# Patient Record
Sex: FEMALE | Race: WHITE | Employment: UNEMPLOYED | URBAN - METROPOLITAN AREA
[De-identification: names, ages, dates, MRNs, and addresses within clinical notes are randomized per-mention and may not be internally consistent; named-entity substitution may affect disease eponyms.]

---

## 2017-02-11 ENCOUNTER — TELEPHONE (OUTPATIENT)
Dept: INTERNAL MEDICINE CLINIC | Age: 57
End: 2017-02-11

## 2017-02-11 RX ORDER — VENLAFAXINE HYDROCHLORIDE 75 MG/1
75 CAPSULE, EXTENDED RELEASE ORAL DAILY
Qty: 90 CAPSULE | Refills: 3 | Status: SHIPPED | OUTPATIENT
Start: 2017-02-11 | End: 2018-04-09 | Stop reason: SDUPTHER

## 2017-03-30 RX ORDER — AMITRIPTYLINE HYDROCHLORIDE 25 MG/1
25 TABLET, FILM COATED ORAL NIGHTLY
Qty: 30 TABLET | Refills: 11 | Status: SHIPPED | OUTPATIENT
Start: 2017-03-30 | End: 2017-03-30 | Stop reason: SDUPTHER

## 2017-03-31 RX ORDER — AMITRIPTYLINE HYDROCHLORIDE 25 MG/1
25 TABLET, FILM COATED ORAL NIGHTLY
Qty: 30 TABLET | Refills: 11 | Status: SHIPPED | OUTPATIENT
Start: 2017-03-31 | End: 2017-05-01 | Stop reason: SDUPTHER

## 2017-04-03 ENCOUNTER — OFFICE VISIT (OUTPATIENT)
Dept: INTERNAL MEDICINE CLINIC | Age: 57
End: 2017-04-03

## 2017-04-03 VITALS
SYSTOLIC BLOOD PRESSURE: 135 MMHG | WEIGHT: 220 LBS | HEIGHT: 64 IN | RESPIRATION RATE: 18 BRPM | BODY MASS INDEX: 37.56 KG/M2 | HEART RATE: 86 BPM | DIASTOLIC BLOOD PRESSURE: 85 MMHG

## 2017-04-03 DIAGNOSIS — Z86.010 HISTORY OF COLONIC POLYPS: Primary | ICD-10-CM

## 2017-04-03 DIAGNOSIS — Z13.31 POSITIVE DEPRESSION SCREENING: ICD-10-CM

## 2017-04-03 DIAGNOSIS — E66.9 OBESITY (BMI 30-39.9): ICD-10-CM

## 2017-04-03 DIAGNOSIS — I10 ESSENTIAL HYPERTENSION: ICD-10-CM

## 2017-04-03 DIAGNOSIS — R73.9 HYPERGLYCEMIA: ICD-10-CM

## 2017-04-03 DIAGNOSIS — E03.9 ACQUIRED HYPOTHYROIDISM: ICD-10-CM

## 2017-04-03 PROCEDURE — G8431 POS CLIN DEPRES SCRN F/U DOC: HCPCS | Performed by: INTERNAL MEDICINE

## 2017-04-03 PROCEDURE — 99214 OFFICE O/P EST MOD 30 MIN: CPT | Performed by: INTERNAL MEDICINE

## 2017-04-03 RX ORDER — ESOMEPRAZOLE MAGNESIUM 40 MG/1
40 CAPSULE, DELAYED RELEASE ORAL
Qty: 90 CAPSULE | Refills: 3 | Status: SHIPPED | OUTPATIENT
Start: 2017-04-03 | End: 2017-04-17 | Stop reason: ALTCHOICE

## 2017-04-03 RX ORDER — ESOMEPRAZOLE MAGNESIUM 40 MG/1
40 CAPSULE, DELAYED RELEASE ORAL DAILY
Qty: 30 CAPSULE | Refills: 3 | Status: SHIPPED | OUTPATIENT
Start: 2017-04-03 | End: 2018-05-01 | Stop reason: SDUPTHER

## 2017-04-03 RX ORDER — LEVOTHYROXINE SODIUM 0.05 MG/1
50 TABLET ORAL DAILY
Qty: 30 TABLET | Refills: 3 | Status: SHIPPED | OUTPATIENT
Start: 2017-04-03 | End: 2017-08-07 | Stop reason: SDUPTHER

## 2017-04-03 RX ORDER — ESOMEPRAZOLE MAGNESIUM 40 MG/1
CAPSULE, DELAYED RELEASE ORAL
COMMUNITY
Start: 2017-01-02 | End: 2017-04-03 | Stop reason: SDUPTHER

## 2017-04-03 RX ORDER — AMLODIPINE BESYLATE 5 MG/1
5 TABLET ORAL DAILY
Qty: 30 TABLET | Refills: 3 | Status: SHIPPED | OUTPATIENT
Start: 2017-04-03 | End: 2017-04-17

## 2017-04-03 RX ORDER — VENLAFAXINE HYDROCHLORIDE 75 MG/1
150 CAPSULE, EXTENDED RELEASE ORAL DAILY
Qty: 30 CAPSULE | Refills: 3 | Status: SHIPPED | OUTPATIENT
Start: 2017-04-03 | End: 2017-04-03

## 2017-04-03 RX ORDER — HYDROCHLOROTHIAZIDE 25 MG/1
25 TABLET ORAL DAILY
Qty: 30 TABLET | Refills: 3 | Status: SHIPPED | OUTPATIENT
Start: 2017-04-03 | End: 2017-07-03 | Stop reason: SDUPTHER

## 2017-04-05 ENCOUNTER — TELEPHONE (OUTPATIENT)
Dept: BARIATRICS/WEIGHT MGMT | Age: 57
End: 2017-04-05

## 2017-04-12 ENCOUNTER — TELEPHONE (OUTPATIENT)
Dept: INTERNAL MEDICINE CLINIC | Age: 57
End: 2017-04-12

## 2017-04-17 ENCOUNTER — OFFICE VISIT (OUTPATIENT)
Dept: INTERNAL MEDICINE CLINIC | Age: 57
End: 2017-04-17

## 2017-04-17 VITALS
HEIGHT: 64 IN | DIASTOLIC BLOOD PRESSURE: 90 MMHG | BODY MASS INDEX: 37.56 KG/M2 | WEIGHT: 220 LBS | RESPIRATION RATE: 18 BRPM | HEART RATE: 90 BPM | SYSTOLIC BLOOD PRESSURE: 135 MMHG

## 2017-04-17 DIAGNOSIS — E03.9 ACQUIRED HYPOTHYROIDISM: ICD-10-CM

## 2017-04-17 DIAGNOSIS — R00.2 PALPITATIONS: ICD-10-CM

## 2017-04-17 DIAGNOSIS — F41.9 ANXIETY: ICD-10-CM

## 2017-04-17 DIAGNOSIS — I10 UNCONTROLLED HYPERTENSION: Primary | ICD-10-CM

## 2017-04-17 DIAGNOSIS — R51.9 HEADACHE, UNSPECIFIED HEADACHE TYPE: ICD-10-CM

## 2017-04-17 PROCEDURE — 93000 ELECTROCARDIOGRAM COMPLETE: CPT | Performed by: INTERNAL MEDICINE

## 2017-04-17 PROCEDURE — 99215 OFFICE O/P EST HI 40 MIN: CPT | Performed by: INTERNAL MEDICINE

## 2017-04-17 RX ORDER — METOPROLOL TARTRATE 50 MG/1
50 TABLET, FILM COATED ORAL 2 TIMES DAILY
Qty: 60 TABLET | Refills: 3 | Status: SHIPPED | OUTPATIENT
Start: 2017-04-17 | End: 2017-05-01 | Stop reason: SDUPTHER

## 2017-04-17 RX ORDER — DIAZEPAM 5 MG/1
TABLET ORAL
COMMUNITY
Start: 2017-03-15 | End: 2017-06-12

## 2017-04-26 ENCOUNTER — HOSPITAL ENCOUNTER (OUTPATIENT)
Dept: MRI IMAGING | Age: 57
Discharge: HOME | End: 2017-04-26
Attending: INTERNAL MEDICINE | Admitting: INTERNAL MEDICINE

## 2017-04-26 DIAGNOSIS — E03.9 ACQUIRED HYPOTHYROIDISM: ICD-10-CM

## 2017-04-26 DIAGNOSIS — R51.9 HEADACHE, UNSPECIFIED HEADACHE TYPE: ICD-10-CM

## 2017-04-26 DIAGNOSIS — R00.2 PALPITATIONS: ICD-10-CM

## 2017-04-26 LAB
LV EF: 63 %
LVEF MODALITY: NORMAL
TSH REFLEX FT4: 1.17 UIU/ML (ref 0.27–4.2)

## 2017-05-01 ENCOUNTER — OFFICE VISIT (OUTPATIENT)
Dept: INTERNAL MEDICINE CLINIC | Age: 57
End: 2017-05-01

## 2017-05-01 VITALS
BODY MASS INDEX: 37.73 KG/M2 | DIASTOLIC BLOOD PRESSURE: 89 MMHG | HEIGHT: 64 IN | HEART RATE: 71 BPM | SYSTOLIC BLOOD PRESSURE: 160 MMHG | WEIGHT: 221 LBS

## 2017-05-01 DIAGNOSIS — I10 UNCONTROLLED HYPERTENSION: ICD-10-CM

## 2017-05-01 PROCEDURE — 99213 OFFICE O/P EST LOW 20 MIN: CPT | Performed by: INTERNAL MEDICINE

## 2017-05-01 RX ORDER — AMITRIPTYLINE HYDROCHLORIDE 25 MG/1
25 TABLET, FILM COATED ORAL 2 TIMES DAILY PRN
Qty: 60 TABLET | Refills: 11 | Status: SHIPPED | OUTPATIENT
Start: 2017-05-01 | End: 2018-06-08 | Stop reason: SDUPTHER

## 2017-05-01 RX ORDER — METOPROLOL TARTRATE 100 MG/1
100 TABLET ORAL 2 TIMES DAILY
Qty: 60 TABLET | Refills: 11 | Status: SHIPPED | OUTPATIENT
Start: 2017-05-01 | End: 2017-08-02 | Stop reason: SDUPTHER

## 2017-05-03 ENCOUNTER — TELEPHONE (OUTPATIENT)
Dept: INTERNAL MEDICINE CLINIC | Age: 57
End: 2017-05-03

## 2017-05-12 ENCOUNTER — TELEPHONE (OUTPATIENT)
Dept: INTERNAL MEDICINE CLINIC | Age: 57
End: 2017-05-12

## 2017-05-16 ENCOUNTER — OFFICE VISIT (OUTPATIENT)
Dept: INTERNAL MEDICINE CLINIC | Age: 57
End: 2017-05-16

## 2017-05-16 VITALS
BODY MASS INDEX: 37.39 KG/M2 | DIASTOLIC BLOOD PRESSURE: 82 MMHG | TEMPERATURE: 97.8 F | SYSTOLIC BLOOD PRESSURE: 134 MMHG | HEIGHT: 64 IN | WEIGHT: 219 LBS | HEART RATE: 68 BPM | OXYGEN SATURATION: 94 %

## 2017-05-16 DIAGNOSIS — E03.9 ACQUIRED HYPOTHYROIDISM: ICD-10-CM

## 2017-05-16 DIAGNOSIS — Z12.11 COLON CANCER SCREENING: ICD-10-CM

## 2017-05-16 DIAGNOSIS — I10 ESSENTIAL HYPERTENSION: Primary | ICD-10-CM

## 2017-05-16 PROCEDURE — 99213 OFFICE O/P EST LOW 20 MIN: CPT | Performed by: INTERNAL MEDICINE

## 2017-05-25 ENCOUNTER — HOSPITAL ENCOUNTER (OUTPATIENT)
Dept: WOMENS IMAGING | Age: 57
Discharge: HOME | End: 2017-05-25
Attending: SURGERY | Admitting: SURGERY

## 2017-05-25 DIAGNOSIS — Z12.31 VISIT FOR SCREENING MAMMOGRAM: ICD-10-CM

## 2017-06-05 ENCOUNTER — TELEPHONE (OUTPATIENT)
Dept: BARIATRICS/WEIGHT MGMT | Age: 57
End: 2017-06-05

## 2017-06-12 ENCOUNTER — OFFICE VISIT (OUTPATIENT)
Dept: BARIATRICS/WEIGHT MGMT | Age: 57
End: 2017-06-12

## 2017-06-12 VITALS
BODY MASS INDEX: 38.07 KG/M2 | DIASTOLIC BLOOD PRESSURE: 86 MMHG | HEIGHT: 64 IN | WEIGHT: 223 LBS | SYSTOLIC BLOOD PRESSURE: 134 MMHG

## 2017-06-12 DIAGNOSIS — I10 HTN (HYPERTENSION), BENIGN: ICD-10-CM

## 2017-06-12 DIAGNOSIS — E03.9 ACQUIRED HYPOTHYROIDISM: ICD-10-CM

## 2017-06-12 DIAGNOSIS — E78.1 HYPERTRIGLYCERIDEMIA: ICD-10-CM

## 2017-06-12 DIAGNOSIS — E66.9 CLASS 2 OBESITY: Primary | ICD-10-CM

## 2017-06-12 PROCEDURE — 99204 OFFICE O/P NEW MOD 45 MIN: CPT | Performed by: FAMILY MEDICINE

## 2017-06-12 ASSESSMENT — ENCOUNTER SYMPTOMS
EYES NEGATIVE: 1
RESPIRATORY NEGATIVE: 1
GASTROINTESTINAL NEGATIVE: 1

## 2017-07-03 DIAGNOSIS — I10 ESSENTIAL HYPERTENSION: ICD-10-CM

## 2017-07-03 RX ORDER — HYDROCHLOROTHIAZIDE 25 MG/1
TABLET ORAL
Qty: 30 TABLET | Refills: 2 | Status: SHIPPED | OUTPATIENT
Start: 2017-07-03 | End: 2017-10-31 | Stop reason: SDUPTHER

## 2017-08-01 ENCOUNTER — TELEPHONE (OUTPATIENT)
Dept: FAMILY MEDICINE CLINIC | Age: 57
End: 2017-08-01

## 2017-08-02 ENCOUNTER — OFFICE VISIT (OUTPATIENT)
Dept: INTERNAL MEDICINE CLINIC | Age: 57
End: 2017-08-02

## 2017-08-02 VITALS
RESPIRATION RATE: 18 BRPM | BODY MASS INDEX: 37.9 KG/M2 | HEART RATE: 71 BPM | OXYGEN SATURATION: 97 % | DIASTOLIC BLOOD PRESSURE: 90 MMHG | HEIGHT: 64 IN | SYSTOLIC BLOOD PRESSURE: 138 MMHG | WEIGHT: 222 LBS

## 2017-08-02 DIAGNOSIS — I10 ESSENTIAL HYPERTENSION: Primary | ICD-10-CM

## 2017-08-02 DIAGNOSIS — R51.9 NONINTRACTABLE EPISODIC HEADACHE, UNSPECIFIED HEADACHE TYPE: ICD-10-CM

## 2017-08-02 PROCEDURE — 99214 OFFICE O/P EST MOD 30 MIN: CPT | Performed by: NURSE PRACTITIONER

## 2017-08-02 RX ORDER — METOPROLOL TARTRATE 100 MG/1
TABLET ORAL
COMMUNITY
Start: 2017-07-07 | End: 2018-03-31 | Stop reason: SDUPTHER

## 2017-08-02 RX ORDER — AMLODIPINE BESYLATE 5 MG/1
TABLET ORAL
COMMUNITY
Start: 2016-09-28 | End: 2018-06-08

## 2017-08-02 ASSESSMENT — ENCOUNTER SYMPTOMS
SHORTNESS OF BREATH: 0
PHOTOPHOBIA: 0
ABDOMINAL PAIN: 0
BACK PAIN: 0

## 2017-08-07 DIAGNOSIS — E03.9 ACQUIRED HYPOTHYROIDISM: ICD-10-CM

## 2017-08-07 RX ORDER — LEVOTHYROXINE SODIUM 0.05 MG/1
TABLET ORAL
Qty: 30 TABLET | Refills: 2 | Status: SHIPPED | OUTPATIENT
Start: 2017-08-07 | End: 2017-11-09 | Stop reason: SDUPTHER

## 2017-10-31 DIAGNOSIS — I10 ESSENTIAL HYPERTENSION: ICD-10-CM

## 2017-10-31 RX ORDER — CHOLECALCIFEROL (VITAMIN D3) 125 MCG
CAPSULE ORAL
Qty: 30 TABLET | Refills: 5 | Status: SHIPPED | OUTPATIENT
Start: 2017-10-31 | End: 2018-06-29 | Stop reason: SDUPTHER

## 2017-10-31 RX ORDER — HYDROCHLOROTHIAZIDE 25 MG/1
TABLET ORAL
Qty: 30 TABLET | Refills: 2 | Status: SHIPPED | OUTPATIENT
Start: 2017-10-31 | End: 2017-12-29 | Stop reason: SDUPTHER

## 2017-11-09 DIAGNOSIS — I10 ESSENTIAL HYPERTENSION: ICD-10-CM

## 2017-11-09 DIAGNOSIS — E03.9 ACQUIRED HYPOTHYROIDISM: ICD-10-CM

## 2017-11-09 RX ORDER — LEVOTHYROXINE SODIUM 0.05 MG/1
TABLET ORAL
Qty: 30 TABLET | Refills: 2 | Status: SHIPPED | OUTPATIENT
Start: 2017-11-09 | End: 2018-02-07 | Stop reason: SDUPTHER

## 2017-11-09 RX ORDER — LOSARTAN POTASSIUM 100 MG/1
100 TABLET ORAL DAILY
Qty: 30 TABLET | Refills: 5 | Status: SHIPPED | OUTPATIENT
Start: 2017-11-09 | End: 2018-05-10 | Stop reason: SDUPTHER

## 2018-02-07 DIAGNOSIS — E03.9 ACQUIRED HYPOTHYROIDISM: ICD-10-CM

## 2018-02-07 RX ORDER — LEVOTHYROXINE SODIUM 0.05 MG/1
TABLET ORAL
Qty: 30 TABLET | Refills: 2 | Status: SHIPPED | OUTPATIENT
Start: 2018-02-07 | End: 2018-08-09 | Stop reason: SDUPTHER

## 2018-03-31 DIAGNOSIS — I10 ESSENTIAL HYPERTENSION: ICD-10-CM

## 2018-04-02 RX ORDER — METOPROLOL TARTRATE 100 MG/1
TABLET ORAL
Qty: 60 TABLET | Refills: 11 | Status: SHIPPED | OUTPATIENT
Start: 2018-04-02 | End: 2019-04-29 | Stop reason: SDUPTHER

## 2018-04-02 RX ORDER — HYDROCHLOROTHIAZIDE 25 MG/1
TABLET ORAL
Qty: 30 TABLET | Refills: 2 | Status: SHIPPED | OUTPATIENT
Start: 2018-04-02 | End: 2018-06-29 | Stop reason: SDUPTHER

## 2018-04-10 RX ORDER — VENLAFAXINE HYDROCHLORIDE 75 MG/1
75 CAPSULE, EXTENDED RELEASE ORAL DAILY
Qty: 90 CAPSULE | Refills: 2 | Status: SHIPPED | OUTPATIENT
Start: 2018-04-10 | End: 2018-10-08 | Stop reason: SDUPTHER

## 2018-05-03 RX ORDER — ESOMEPRAZOLE MAGNESIUM 40 MG/1
CAPSULE, DELAYED RELEASE ORAL
Qty: 90 CAPSULE | Refills: 0 | Status: SHIPPED | OUTPATIENT
Start: 2018-05-03 | End: 2018-08-03 | Stop reason: SDUPTHER

## 2018-05-30 ENCOUNTER — OFFICE VISIT (OUTPATIENT)
Dept: INTERNAL MEDICINE CLINIC | Age: 58
End: 2018-05-30

## 2018-05-30 VITALS
SYSTOLIC BLOOD PRESSURE: 145 MMHG | DIASTOLIC BLOOD PRESSURE: 90 MMHG | OXYGEN SATURATION: 93 % | HEART RATE: 78 BPM | BODY MASS INDEX: 39.09 KG/M2 | RESPIRATION RATE: 17 BRPM | WEIGHT: 229 LBS | HEIGHT: 64 IN | TEMPERATURE: 98.7 F

## 2018-05-30 DIAGNOSIS — J01.40 ACUTE NON-RECURRENT PANSINUSITIS: Primary | ICD-10-CM

## 2018-05-30 DIAGNOSIS — R05.9 COUGH: ICD-10-CM

## 2018-05-30 PROCEDURE — 99213 OFFICE O/P EST LOW 20 MIN: CPT | Performed by: NURSE PRACTITIONER

## 2018-05-30 RX ORDER — AMOXICILLIN AND CLAVULANATE POTASSIUM 875; 125 MG/1; MG/1
1 TABLET, FILM COATED ORAL 2 TIMES DAILY
Qty: 14 TABLET | Refills: 0 | Status: SHIPPED | OUTPATIENT
Start: 2018-05-30 | End: 2018-06-06

## 2018-05-30 RX ORDER — GUAIFENESIN AND CODEINE PHOSPHATE 100; 10 MG/5ML; MG/5ML
5 SOLUTION ORAL 4 TIMES DAILY PRN
Qty: 150 ML | Refills: 0 | Status: SHIPPED | OUTPATIENT
Start: 2018-05-30 | End: 2018-06-06

## 2018-05-30 ASSESSMENT — ENCOUNTER SYMPTOMS
SINUS PAIN: 1
SINUS PRESSURE: 1
COUGH: 1

## 2018-06-08 ENCOUNTER — OFFICE VISIT (OUTPATIENT)
Dept: INTERNAL MEDICINE CLINIC | Age: 58
End: 2018-06-08

## 2018-06-08 VITALS
HEIGHT: 64 IN | OXYGEN SATURATION: 94 % | SYSTOLIC BLOOD PRESSURE: 142 MMHG | HEART RATE: 70 BPM | DIASTOLIC BLOOD PRESSURE: 82 MMHG | BODY MASS INDEX: 38.07 KG/M2 | RESPIRATION RATE: 18 BRPM | WEIGHT: 223 LBS | TEMPERATURE: 99.6 F

## 2018-06-08 DIAGNOSIS — J04.0 LARYNGITIS: Primary | ICD-10-CM

## 2018-06-08 PROCEDURE — 99213 OFFICE O/P EST LOW 20 MIN: CPT | Performed by: NURSE PRACTITIONER

## 2018-06-08 ASSESSMENT — PATIENT HEALTH QUESTIONNAIRE - PHQ9
SUM OF ALL RESPONSES TO PHQ9 QUESTIONS 1 & 2: 0
SUM OF ALL RESPONSES TO PHQ QUESTIONS 1-9: 0
1. LITTLE INTEREST OR PLEASURE IN DOING THINGS: 0
2. FEELING DOWN, DEPRESSED OR HOPELESS: 0

## 2018-06-11 RX ORDER — AMITRIPTYLINE HYDROCHLORIDE 25 MG/1
TABLET, FILM COATED ORAL
Qty: 60 TABLET | Refills: 11 | Status: SHIPPED | OUTPATIENT
Start: 2018-06-11 | End: 2019-07-05 | Stop reason: SDUPTHER

## 2018-06-11 RX ORDER — AMITRIPTYLINE HYDROCHLORIDE 25 MG/1
TABLET, FILM COATED ORAL
Qty: 60 TABLET | Refills: 0 | Status: SHIPPED | OUTPATIENT
Start: 2018-06-11 | End: 2018-06-11 | Stop reason: SDUPTHER

## 2018-06-13 ASSESSMENT — ENCOUNTER SYMPTOMS
GASTROINTESTINAL NEGATIVE: 1
VOICE CHANGE: 1
COUGH: 0
RHINORRHEA: 0

## 2018-06-28 ENCOUNTER — HOSPITAL ENCOUNTER (OUTPATIENT)
Dept: WOMENS IMAGING | Age: 58
Discharge: HOME | End: 2018-06-28
Attending: SURGERY | Admitting: SURGERY

## 2018-06-28 DIAGNOSIS — Z12.31 VISIT FOR SCREENING MAMMOGRAM: ICD-10-CM

## 2018-06-29 DIAGNOSIS — I10 ESSENTIAL HYPERTENSION: ICD-10-CM

## 2018-06-29 RX ORDER — HYDROCHLOROTHIAZIDE 25 MG/1
TABLET ORAL
Qty: 30 TABLET | Refills: 2 | Status: SHIPPED | OUTPATIENT
Start: 2018-06-29 | End: 2018-09-29 | Stop reason: SDUPTHER

## 2018-06-29 RX ORDER — CHOLECALCIFEROL (VITAMIN D3) 125 MCG
CAPSULE ORAL
Qty: 30 TABLET | Refills: 5 | Status: SHIPPED | OUTPATIENT
Start: 2018-06-29 | End: 2018-12-28 | Stop reason: SDUPTHER

## 2018-07-11 ENCOUNTER — PAT TELEPHONE (OUTPATIENT)
Dept: PREADMISSION TESTING | Age: 58
End: 2018-07-11

## 2018-07-11 VITALS — HEIGHT: 64 IN | BODY MASS INDEX: 38.07 KG/M2 | WEIGHT: 223 LBS

## 2018-07-11 NOTE — PRE-PROCEDURE INSTRUCTIONS
[address]  time__10 am_________        Surgery time____________    Take the following medications with a sip of water:losartan, metoprolol, nexium and zyrtec    Do not eat or drink anything after 12:00 midnight prior to your surgery. This includes water chewing gum, mints and ice chips. You may brush your teeth and gargle the morning of your surgery, but do not swallow the water     Please see your family doctor/pediatrician for a history and physical and/or concerning medications. Bring any test results/reports from your physicians office. If you are under the care of a heart doctor or specialist doctor, please be aware that you may be asked to them for clearance    You may be asked to stop blood thinners such as Coumadin, Plavix, Fragmin, Lovenox, etc., or any anti-inflammatories such as:  Aspirin, Ibuprofen, Advil, Naproxen prior to your surgery. We also ask that you stop any OTC medications such as fish oil, vitamin E, glucosamine, garlic, Multivitamins, COQ 10, etc.    We ask that you do not smoke 24 hours prior to surgery  We ask that you do not  drink any alcoholic beverages 24 hours prior to surgery     You must make arrangements for a responsible adult to take you home after your surgery. For your safety you will not be allowed to leave alone or drive yourself home. Your surgery will be cancelled if you do not have a ride home. Also for your safety, it is strongly suggested that someone stay with you the first 24 hours after your surgery. A parent or legal guardian must accompany a child scheduled for surgery and plan to stay at the hospital until the child is discharged. Please do not bring other children with you. For your comfort, please wear simple loose fitting clothing to the hospital.  Please do not bring valuables.     Do not wear any make-up or nail polish on your fingers or toes      For your safety, please do not wear any jewelry or body piercing's on the day of surgery. All jewelry must be removed. If you have dentures, they will be removed before going to operating room. For your convenience, we will provide you with a container. If you wear contact lenses or glasses, they will be removed, please bring a case for them. If you have a living will and a durable power of  for healthcare, please bring in a copy. As part of our patient safety program to minimize surgical site infections, we ask you to do the following:    · Please notify your surgeon if you develop any illness between         now and the  day of your surgery. · This includes a cough, cold, fever, sore throat, nausea,         or vomiting, and diarrhea, etc.  ·  Please notify your surgeon if you experience dizziness, shortness         of breath or blurred vision between now and the time of your surgery. Do not shave your operative site 96 hours prior to surgery. For face and neck surgery, men may use an electric razor 48 hours   prior to surgery. You may shower the night before surgery or the morning of   your surgery with an antibacterial soap. You will need to bring a photo ID and insurance card    [facility] has an onsite pharmacy, would you like to utilize our pharmacy     If you will be staying overnight and use a C-pap machine, please bring   your C-pap to hospital     Our goal is to provide you with excellent care, therefore, visitors will be limited to two(2) in the room at a time so that we may focus on providing this care for you. Please contact pre-admission testing if you have any further questions. [facility] phone number:  [phone] [facility] fax number:  [phone]  Please note these are generalized instructions for all surgical cases, you may be provided with more specific instructions according to your surgery.

## 2018-07-13 ENCOUNTER — HOSPITAL ENCOUNTER (OUTPATIENT)
Dept: ENDOSCOPY | Age: 58
Discharge: HOME | End: 2018-07-13
Attending: INTERNAL MEDICINE | Admitting: INTERNAL MEDICINE

## 2018-07-13 VITALS
RESPIRATION RATE: 14 BRPM | OXYGEN SATURATION: 99 % | HEIGHT: 64 IN | TEMPERATURE: 97.2 F | DIASTOLIC BLOOD PRESSURE: 84 MMHG | WEIGHT: 220 LBS | SYSTOLIC BLOOD PRESSURE: 135 MMHG | HEART RATE: 66 BPM | BODY MASS INDEX: 37.56 KG/M2

## 2018-07-13 RX ORDER — MORPHINE SULFATE 2 MG/ML
2 INJECTION, SOLUTION INTRAMUSCULAR; INTRAVENOUS EVERY 5 MIN PRN
Status: DISCONTINUED | OUTPATIENT
Start: 2018-07-13 | End: 2018-07-14 | Stop reason: HOSPADM

## 2018-07-13 RX ORDER — OXYCODONE HYDROCHLORIDE 5 MG/1
5 TABLET ORAL PRN
Status: ACTIVE | OUTPATIENT
Start: 2018-07-13 | End: 2018-07-13

## 2018-07-13 RX ORDER — SODIUM CHLORIDE 0.9 % (FLUSH) 0.9 %
10 SYRINGE (ML) INJECTION PRN
Status: DISCONTINUED | OUTPATIENT
Start: 2018-07-13 | End: 2018-07-14 | Stop reason: HOSPADM

## 2018-07-13 RX ORDER — OXYCODONE HYDROCHLORIDE 5 MG/1
10 TABLET ORAL PRN
Status: ACTIVE | OUTPATIENT
Start: 2018-07-13 | End: 2018-07-13

## 2018-07-13 RX ORDER — ONDANSETRON 2 MG/ML
4 INJECTION INTRAMUSCULAR; INTRAVENOUS
Status: ACTIVE | OUTPATIENT
Start: 2018-07-13 | End: 2018-07-13

## 2018-07-13 RX ORDER — FENTANYL CITRATE 50 UG/ML
25 INJECTION, SOLUTION INTRAMUSCULAR; INTRAVENOUS EVERY 5 MIN PRN
Status: DISCONTINUED | OUTPATIENT
Start: 2018-07-13 | End: 2018-07-14 | Stop reason: HOSPADM

## 2018-07-13 RX ORDER — SODIUM CHLORIDE 9 MG/ML
INJECTION, SOLUTION INTRAVENOUS CONTINUOUS
Status: DISCONTINUED | OUTPATIENT
Start: 2018-07-13 | End: 2018-07-14 | Stop reason: HOSPADM

## 2018-07-13 RX ORDER — FENTANYL CITRATE 50 UG/ML
50 INJECTION, SOLUTION INTRAMUSCULAR; INTRAVENOUS EVERY 5 MIN PRN
Status: DISCONTINUED | OUTPATIENT
Start: 2018-07-13 | End: 2018-07-14 | Stop reason: HOSPADM

## 2018-07-13 RX ORDER — SODIUM CHLORIDE 0.9 % (FLUSH) 0.9 %
10 SYRINGE (ML) INJECTION EVERY 12 HOURS SCHEDULED
Status: DISCONTINUED | OUTPATIENT
Start: 2018-07-13 | End: 2018-07-14 | Stop reason: HOSPADM

## 2018-07-13 RX ORDER — MEPERIDINE HYDROCHLORIDE 25 MG/ML
12.5 INJECTION INTRAMUSCULAR; INTRAVENOUS; SUBCUTANEOUS EVERY 5 MIN PRN
Status: DISCONTINUED | OUTPATIENT
Start: 2018-07-13 | End: 2018-07-14 | Stop reason: HOSPADM

## 2018-07-13 RX ORDER — MORPHINE SULFATE 2 MG/ML
1 INJECTION, SOLUTION INTRAMUSCULAR; INTRAVENOUS EVERY 5 MIN PRN
Status: DISCONTINUED | OUTPATIENT
Start: 2018-07-13 | End: 2018-07-14 | Stop reason: HOSPADM

## 2018-07-13 RX ADMIN — SODIUM CHLORIDE: 9 INJECTION, SOLUTION INTRAVENOUS at 10:46

## 2018-07-13 ASSESSMENT — PAIN SCALES - GENERAL
PAINLEVEL_OUTOF10: 0
PAINLEVEL_OUTOF10: 0

## 2018-07-13 ASSESSMENT — PAIN - FUNCTIONAL ASSESSMENT: PAIN_FUNCTIONAL_ASSESSMENT: 0-10

## 2018-07-13 NOTE — OP NOTE
Colonoscopy Procedure Note      Patient: Keke Aparicio  : 1960  Acct#:     Procedure: Colonoscopy, diagnostic    Date:  2018    Surgeon:  Khalida Melgar MD    Referring Physician:  Rafy Grissom MD, MD    Previous Colonoscopy: YES  Date: ~8 years ago  Greater than 3 years: YES    Preoperative Diagnosis:  57 yo woman with PMH of colon polyps here for surveillance colonoscopy. She reports her last colonoscopy was about 8 years ago. She has a family history of colon polyps in her mother. Postoperative Diagnosis:    1) Normal colonoscopy. Consent:  The patient or their legal guardian has signed a consent, and is aware of the potential risks, benefits, alternatives, and potential complications of this procedure. These include, but are not limited to hemorrhage, bleeding, post procedural pain, perforation, phlebitis, aspiration, hypotension, hypoxia, cardiovascular events such as arryhthmia, and possibly death. Additionally, the possibility of missed colonic polyps and interval colon cancer was discussed in the consent. Anesthesia:  Administered by monitored anesthesia care. Procedure: An informed consent was obtained from the patient after explanation of indications, benefits, possible risks and complications of the procedure. The patient was then taken to the endoscopy suite, placed in the left lateral decubitus position, and the above IV anesthesia was administered. A digital rectal examination was performed and revealed negative without mass, lesions or tenderness. The Olympus video colonoscope was placed in the patient's rectum under digital direction and advanced to the cecum. The cecum was identified by characteristic anatomy and ballottment. The ileocecal valve was identified. The preparation was good. The terminal ileum was not intubated.     The scope was then withdrawn back through the cecum, ascending, transverse, descending, sigmoid colon, and rectum. Careful circumferential examination of the mucosa in these areas demonstrated no abnormalities. The scope was then withdrawn into the rectum and retroflexed. The retroflexed view of the anal verge and rectum demonstrates no abnormalities. The scope was straightened, the colon was decompressed and the scope was withdrawn from the patient. The patient tolerated the procedure well and was taken to the PACU in good condition. Estimated blood loss: None    Impression:    1) Normal colonoscopy. Recommendations:    1) Repeat colonoscopy in 5 years.      Ania Lizama, [facility]  7/13/2018  [phone]

## 2018-07-13 NOTE — ANESTHESIA PRE-OP
[facility] Department of Anesthesiology  Pre-Anesthesia Evaluation/Consultation       Name:  Pallavi Chowdhury  : 1960  Age:  56 y. o. MRN:  [id number]  Date: 2018           Procedure (Scheduled):  colonoscopy  Surgeon:  Dr. Raman Lee     No Known Allergies  Patient Active Problem List   Diagnosis    Obesity (BMI 30-39. 9)    Hypertriglyceridemia    Lipoma of torso    Hot flashes    Hyperglycemia    Acquired hypothyroidism    Essential hypertension    Allergic rhinitis    Status post hysterectomy    headache     Past Medical History:   Diagnosis Date    Anxiety     GERD (gastroesophageal reflux disease)     Hypertension     Hypothyroidism     Polyp of colon      Past Surgical History:   Procedure Laterality Date    COLONOSCOPY      HYSTERECTOMY       Social History   Substance Use Topics    Smoking status: Never Smoker    Smokeless tobacco: Never Used    Alcohol use Yes      Comment: soc     Medications  Current Outpatient Prescriptions on File Prior to Encounter   Medication Sig Dispense Refill    hydrochlorothiazide (HYDRODIURIL) 25 MG tablet TAKE 1 TABLET BY MOUTH ONCE A DAY 30 tablet 2    Cholecalciferol (VITAMIN D3) 2000 units TABS TAKE 1 TABLET BY MOUTH DAILY 30 tablet 5    amitriptyline (ELAVIL) 25 MG tablet TAKE 1 TABLET BY MOUTH TWICE DAILY AS NEEDED FOR SLEEP OR PAIN 60 tablet 11    losartan (COZAAR) 100 MG tablet TAKE 1 TABLET BY MOUTH DAILY 30 tablet 11    esomeprazole (NEXIUM) 40 MG delayed release capsule TAKE 1 CAPSULE BY MOUTH EVERY MORNING BEFORE BREAKFAST 90 capsule 0    venlafaxine (EFFEXOR XR) 75 MG extended release capsule TAKE 1 CAPSULE BY MOUTH DAILY 90 capsule 2    metoprolol (LOPRESSOR) 100 MG tablet TAKE 1 TABLET BY MOUTH TWICE DAILY 60 tablet 11    levothyroxine (SYNTHROID) 50 MCG tablet TAKE 1 TABLET BY MOUTH DAILY 30 tablet 2    azelastine HCl 0.15 % SOLN 2 sprays by Each Nare route       cetirizine (ZYRTEC) 10 MG tablet Take 10 mg by mouth daily        No current facility-administered medications on file prior to encounter.       Current Outpatient Prescriptions   Medication Sig Dispense Refill    hydrochlorothiazide (HYDRODIURIL) 25 MG tablet TAKE 1 TABLET BY MOUTH ONCE A DAY 30 tablet 2    Cholecalciferol (VITAMIN D3) 2000 units TABS TAKE 1 TABLET BY MOUTH DAILY 30 tablet 5    amitriptyline (ELAVIL) 25 MG tablet TAKE 1 TABLET BY MOUTH TWICE DAILY AS NEEDED FOR SLEEP OR PAIN 60 tablet 11    losartan (COZAAR) 100 MG tablet TAKE 1 TABLET BY MOUTH DAILY 30 tablet 11    esomeprazole (NEXIUM) 40 MG delayed release capsule TAKE 1 CAPSULE BY MOUTH EVERY MORNING BEFORE BREAKFAST 90 capsule 0    venlafaxine (EFFEXOR XR) 75 MG extended release capsule TAKE 1 CAPSULE BY MOUTH DAILY 90 capsule 2    metoprolol (LOPRESSOR) 100 MG tablet TAKE 1 TABLET BY MOUTH TWICE DAILY 60 tablet 11    levothyroxine (SYNTHROID) 50 MCG tablet TAKE 1 TABLET BY MOUTH DAILY 30 tablet 2    azelastine HCl 0.15 % SOLN 2 sprays by Each Nare route       cetirizine (ZYRTEC) 10 MG tablet Take 10 mg by mouth daily        Current Facility-Administered Medications   Medication Dose Route Frequency Provider Last Rate Last Dose    0.9 % sodium chloride infusion   Intravenous Continuous Laura Jaimes MD 75 mL/hr at 18 1046      sodium chloride flush 0.9 % injection 10 mL  10 mL Intravenous 2 times per day Laura Jaimes MD        sodium chloride flush 0.9 % injection 10 mL  10 mL Intravenous PRN Laura Jaimes MD         Vital Signs (Current)   Vitals:    18 1035   BP: (!) 166/99   Pulse: 71   Resp: 16   Temp: 97.6 °F (36.4 °C)   SpO2: 99%     Vital Signs Statistics (for past 48 hrs)     Temp  Av.6 °F (36.4 °C)  Min: 97.6 °F (36.4 °C)   Min taken time: 18 1035  Max: 97.6 °F (36.4 °C)   Max taken time: 18 1035  Pulse  Av  Min: 71   Min taken time: 18 1035  Max: 71   Max taken time: 18 1035  Resp  Av  Min: 12   Min taken time: 18 1035  Max: 12   Max taken time: 18 1035  BP  Min: 166/99   Min taken time: 18 1035  Max: 166/99   Max taken time: 18 1035  SpO2  Av %  Min: 99 %   Min taken time: 18 1035  Max: 99 %   Max taken time: 18 1035    BP Readings from Last 3 Encounters:   18 (!) 166/99   18 (!) 142/82   18 (!) 145/90     BMI  Body mass index is 37.76 kg/m². Estimated body mass index is 37.76 kg/m² as calculated from the following:    Height as of this encounter: 5' 4\" (1.626 m). Weight as of this encounter: 220 lb (99.8 kg). CBC   Lab Results   Component Value Date    WBC 13.9 2013    RBC 4.93 2013    HGB 13.5 2013    HCT 41.5 2013    MCV 84.3 2013    RDW 13.1 2013     2013     CMP    Lab Results   Component Value Date     10/28/2016    K 3.9 10/28/2016    CL 98 10/28/2016    CO2 26 10/28/2016    BUN 12 10/28/2016    CREATININE 0.6 10/28/2016    GFRAA >60 10/28/2016    GFRAA >60 2013    AGRATIO 1.3 2013    LABGLOM >60 10/28/2016    GLUCOSE 108 10/28/2016    PROT 7.7 2013    CALCIUM 9.5 10/28/2016    BILITOT 0.50 2013    ALKPHOS 132 2013    AST 22 2013    ALT 22 2013     BMP    Lab Results   Component Value Date     10/28/2016    K 3.9 10/28/2016    CL 98 10/28/2016    CO2 26 10/28/2016    BUN 12 10/28/2016    CREATININE 0.6 10/28/2016    CALCIUM 9.5 10/28/2016    GFRAA >60 10/28/2016    GFRAA >60 2013    LABGLOM >60 10/28/2016    GLUCOSE 108 10/28/2016     POCGlucose  No results for input(s): GLUCOSE in the last 72 hours.    Coags  No results found for: PROTIME, INR, APTT  HCG (If Applicable) No results found for: PREGTESTUR, PREGSERUM, HCG, HCGQUANT   ABGs No results found for: PHART, PO2ART, KYE4ABQ, IBD3NNM, BEART, E2KPLTNO   Type & Screen (If Applicable)  No results found for: Renetta Barker 2018  10:56 AM

## 2018-08-06 RX ORDER — ESOMEPRAZOLE MAGNESIUM 40 MG/1
CAPSULE, DELAYED RELEASE ORAL
Qty: 90 CAPSULE | Refills: 0 | Status: SHIPPED | OUTPATIENT
Start: 2018-08-06 | End: 2018-11-01 | Stop reason: SDUPTHER

## 2018-08-09 DIAGNOSIS — E03.9 ACQUIRED HYPOTHYROIDISM: ICD-10-CM

## 2018-08-09 RX ORDER — LEVOTHYROXINE SODIUM 0.05 MG/1
TABLET ORAL
Qty: 30 TABLET | Refills: 0 | Status: SHIPPED | OUTPATIENT
Start: 2018-08-09 | End: 2018-09-07 | Stop reason: SDUPTHER

## 2018-09-07 DIAGNOSIS — E03.9 ACQUIRED HYPOTHYROIDISM: ICD-10-CM

## 2018-09-07 RX ORDER — LEVOTHYROXINE SODIUM 0.05 MG/1
TABLET ORAL
Qty: 30 TABLET | Refills: 0 | Status: SHIPPED | OUTPATIENT
Start: 2018-09-07 | End: 2018-10-08 | Stop reason: SDUPTHER

## 2018-09-14 ENCOUNTER — OFFICE VISIT (OUTPATIENT)
Dept: INTERNAL MEDICINE CLINIC | Age: 58
End: 2018-09-14

## 2018-09-14 VITALS
HEART RATE: 68 BPM | RESPIRATION RATE: 16 BRPM | DIASTOLIC BLOOD PRESSURE: 94 MMHG | WEIGHT: 219 LBS | OXYGEN SATURATION: 95 % | SYSTOLIC BLOOD PRESSURE: 154 MMHG | BODY MASS INDEX: 37.39 KG/M2 | HEIGHT: 64 IN

## 2018-09-14 DIAGNOSIS — E66.9 OBESITY (BMI 30-39.9): ICD-10-CM

## 2018-09-14 DIAGNOSIS — Z12.11 SCREEN FOR COLON CANCER: ICD-10-CM

## 2018-09-14 DIAGNOSIS — R73.9 HYPERGLYCEMIA: ICD-10-CM

## 2018-09-14 DIAGNOSIS — I10 ESSENTIAL HYPERTENSION: ICD-10-CM

## 2018-09-14 DIAGNOSIS — E78.1 HYPERTRIGLYCERIDEMIA: ICD-10-CM

## 2018-09-14 DIAGNOSIS — E03.9 ACQUIRED HYPOTHYROIDISM: Primary | ICD-10-CM

## 2018-09-14 LAB
A/G RATIO: 1.6 (ref 1.1–2.2)
ALBUMIN SERPL-MCNC: 4.7 G/DL (ref 3.4–5)
ALP BLD-CCNC: 111 U/L (ref 40–129)
ALT SERPL-CCNC: 54 U/L (ref 10–40)
ANION GAP SERPL CALCULATED.3IONS-SCNC: 15 MMOL/L (ref 3–16)
AST SERPL-CCNC: 42 U/L (ref 15–37)
BILIRUB SERPL-MCNC: 0.7 MG/DL (ref 0–1)
BUN BLDV-MCNC: 13 MG/DL (ref 7–20)
CALCIUM SERPL-MCNC: 10.4 MG/DL (ref 8.3–10.6)
CHLORIDE BLD-SCNC: 95 MMOL/L (ref 99–110)
CHOLESTEROL, TOTAL: 207 MG/DL (ref 0–199)
CO2: 29 MMOL/L (ref 21–32)
CREAT SERPL-MCNC: 0.6 MG/DL (ref 0.6–1.1)
GFR AFRICAN AMERICAN: >60
GFR NON-AFRICAN AMERICAN: >60
GLOBULIN: 3 G/DL
GLUCOSE BLD-MCNC: 140 MG/DL (ref 70–99)
HDLC SERPL-MCNC: 39 MG/DL (ref 40–60)
LDL CHOLESTEROL CALCULATED: 123 MG/DL
POTASSIUM SERPL-SCNC: 3.7 MMOL/L (ref 3.5–5.1)
SODIUM BLD-SCNC: 139 MMOL/L (ref 136–145)
TOTAL PROTEIN: 7.7 G/DL (ref 6.4–8.2)
TRIGL SERPL-MCNC: 225 MG/DL (ref 0–150)
TSH REFLEX FT4: 2.43 UIU/ML (ref 0.27–4.2)
VLDLC SERPL CALC-MCNC: 45 MG/DL

## 2018-09-14 PROCEDURE — 99214 OFFICE O/P EST MOD 30 MIN: CPT | Performed by: INTERNAL MEDICINE

## 2018-09-14 RX ORDER — INDAPAMIDE 1.25 MG
TABLET ORAL
COMMUNITY
Start: 2018-08-13 | End: 2021-03-21

## 2018-09-14 NOTE — PROGRESS NOTES
CONSTITUTIONAL: [x] All Symptoms Negative  [] Fever     [] Chills     [] Weight Loss  [] Fatigue     [] Sweating     [] Weakness  Comments:     EYE: [x] All Symptoms Negative  [] Blurred Vision     [] Double Vision     [] Light Sensitivity  [] Eye Pain     [] Eye Discharge     [] Eye Redness  Comments:     GASTROINTESTINAL: [x] All Symptoms Negative  [] Heart Burn     [] Nausea     [] Vomiting  [] Abdominal Pain     Diarrhea     [] Constipation  [] Blood in Stool     [] Black Tarry Stool  Comments:     ENDO: [x] All Symptoms Negative  [] Easy Bruising     [] Increased Thirst  Comments:     SKIN: [x] All Symptoms Negative  [] Rash     [] Itching  Comments:     NEUROLOGICAL: [] All Symptoms Negative  [] Dizziness     [] Tingling     [] Tremor  [] Sensory Change     [] Speech Change     [] Focal Weakness  [] Seizures     [] LOC  Comments:  Compressed nerve in neck (both sides?)    CARDIOVASCULAR: [x] All Symptoms Negative  [] Chest Pain     [] Palpitations     [] Orthopnea  [] Cramps When Walking     [] Leg Swelling  Comments:     URINARY: [x] All Symptoms Negative  [] Pain/Burning     [] Urgency     [] Frequency  [] Blood in Urine     [] Flank Pain  Comments:     PSYCHIATRIC: [x] All Symptoms Negative  [] Nervous/Anxious     [] Suicidal Ideas     [] Substance Use/Abuse  [] Hallucinations     [] Nervous/Anxious     [] Insomnia  [] Memory Loss  Comments:     HENT: [x] All Symptoms Negative  [] Headache     [] Hearing Loss     [] Ringing     [] Ear Pain  [] Ear Discharge     [] Nose Bleed     [] Congestion  [] Stridor     [] Sore Throat  Comments:     RESPIRATORY: [x] All Symptoms Negative  [] Cough     [] Bloody Mucous     [] Sputum Production  [] Shortness of Breath     [] Wheezing  Comments:     MUSKOSKELETAL: [] All Symptoms Negative  [] Muscle Aches     [x] Neck Pain     [x] Back Pain  [] Joint Pain     [] Falls  Comments:
Chief Complaint   Patient presents with    Hypertension     HPI:     ROS (1+):        Medications reviewed and reconciled with what patient reports to be taking. There were no vitals taken for this visit. Physical Exam    ASSESSMENT/PLAN: Pt received counseling and, if relevant, printed instructions for all symptoms listed in CC and HPI, as well as for all diagnoses listed below. There are no diagnoses linked to this encounter. Problem List Items Addressed This Visit     None            No Follow-up on file.
Hypertriglyceridemia    Relevant Medications    metoprolol (LOPRESSOR) 100 MG tablet    losartan (COZAAR) 100 MG tablet    hydrochlorothiazide (HYDRODIURIL) 25 MG tablet    Hyperglycemia    Essential hypertension    Relevant Medications    metoprolol (LOPRESSOR) 100 MG tablet    losartan (COZAAR) 100 MG tablet    hydrochlorothiazide (HYDRODIURIL) 25 MG tablet    Acquired hypothyroidism - Primary    Relevant Medications    levothyroxine (SYNTHROID) 50 MCG tablet        Orders Placed This Encounter   Procedures    COMPREHENSIVE METABOLIC PANEL     Standing Status:   Future     Standing Expiration Date:   9/14/2019    Hep C AB RLFX HCV PCR-A     Standing Status:   Future     Standing Expiration Date:   9/14/2019    HIV-1 AND HIV-2 ANTIBODIES     Standing Status:   Future     Standing Expiration Date:   9/14/2019    LIPID PANEL     Standing Status:   Future     Standing Expiration Date:   9/14/2019     Order Specific Question:   Is Patient Fasting?/# of Hours     Answer:   yes    HEMOGLOBIN A1C     Standing Status:   Future     Standing Expiration Date:   9/14/2019    TSH WITH REFLEX TO FT4     Standing Status:   Future     Standing Expiration Date:   9/14/2019    POCT Fecal Immunochemical Test (FIT)     Standing Status:   Future     Standing Expiration Date:   9/14/2019       I have reconciled the medications in chart with what patient reports to be taking, and reviewed action/ side effects and how to take any new medications. Patient/caregiver understands purpose and side effects. A complete  list of medications was provided in their after-visit summary. No Follow-up on file. Time based billing: I spent over 25 minutes with this patient, and as is the nature of primary care and typical for my extended visits, over 50 percent of this visit was spent on counseling and coordination of care.

## 2018-09-15 LAB
ESTIMATED AVERAGE GLUCOSE: 134.1 MG/DL
HBA1C MFR BLD: 6.3 %
HIV AG/AB: NORMAL
HIV ANTIGEN: NORMAL
HIV-1 ANTIBODY: NORMAL
HIV-2 AB: NORMAL

## 2018-09-17 LAB
HEPATITIS C VIRUS AB BY CIA INDEX: <0.02 IV
HEPATITIS C VIRUS AB BY CIA INTERPRETATION: NEGATIVE

## 2018-09-29 DIAGNOSIS — I10 ESSENTIAL HYPERTENSION: ICD-10-CM

## 2018-10-01 RX ORDER — HYDROCHLOROTHIAZIDE 25 MG/1
TABLET ORAL
Qty: 30 TABLET | Refills: 2 | Status: SHIPPED | OUTPATIENT
Start: 2018-10-01 | End: 2018-12-28 | Stop reason: SDUPTHER

## 2018-10-01 NOTE — TELEPHONE ENCOUNTER
Patient requesting a medication refill.   Medication: hctz  Dosage: 25 mg  Frequency: 1 tab po daily  Last filled on: 6/29/18  [facility]  Next office visit: 3/18/18  Last regular office visit: 9/14/18

## 2018-10-08 DIAGNOSIS — E03.9 ACQUIRED HYPOTHYROIDISM: ICD-10-CM

## 2018-10-09 RX ORDER — LEVOTHYROXINE SODIUM 0.05 MG/1
TABLET ORAL
Qty: 30 TABLET | Refills: 0 | Status: SHIPPED | OUTPATIENT
Start: 2018-10-09 | End: 2018-11-28 | Stop reason: SDUPTHER

## 2018-10-09 RX ORDER — VENLAFAXINE HYDROCHLORIDE 75 MG/1
75 CAPSULE, EXTENDED RELEASE ORAL DAILY
Qty: 90 CAPSULE | Refills: 2 | Status: SHIPPED | OUTPATIENT
Start: 2018-10-09 | End: 2019-07-05 | Stop reason: SDUPTHER

## 2018-11-01 RX ORDER — ESOMEPRAZOLE MAGNESIUM 40 MG/1
CAPSULE, DELAYED RELEASE ORAL
Qty: 90 CAPSULE | Refills: 3 | Status: SHIPPED | OUTPATIENT
Start: 2018-11-01 | End: 2019-11-11 | Stop reason: SDUPTHER

## 2018-11-21 ENCOUNTER — APPOINTMENT (OUTPATIENT)
Dept: GENERAL RADIOLOGY | Age: 58
End: 2018-11-21
Payer: COMMERCIAL

## 2018-11-21 ENCOUNTER — HOSPITAL ENCOUNTER (EMERGENCY)
Age: 58
Discharge: HOME | End: 2018-11-21
Attending: EMERGENCY MEDICINE
Payer: COMMERCIAL

## 2018-11-21 VITALS
BODY MASS INDEX: 38.2 KG/M2 | HEIGHT: 64 IN | RESPIRATION RATE: 16 BRPM | HEART RATE: 74 BPM | OXYGEN SATURATION: 96 % | WEIGHT: 223.77 LBS | DIASTOLIC BLOOD PRESSURE: 92 MMHG | SYSTOLIC BLOOD PRESSURE: 146 MMHG | TEMPERATURE: 97.9 F

## 2018-11-21 DIAGNOSIS — S50.02XA CONTUSION OF LEFT ELBOW, INITIAL ENCOUNTER: Primary | ICD-10-CM

## 2018-11-21 DIAGNOSIS — S53.409A ELBOW SPRAIN, INITIAL ENCOUNTER: ICD-10-CM

## 2018-11-21 PROCEDURE — 6360000002 HC RX W HCPCS: Performed by: EMERGENCY MEDICINE

## 2018-11-21 PROCEDURE — 96372 THER/PROPH/DIAG INJ SC/IM: CPT

## 2018-11-21 PROCEDURE — 73090 X-RAY EXAM OF FOREARM: CPT

## 2018-11-21 PROCEDURE — 99283 EMERGENCY DEPT VISIT LOW MDM: CPT

## 2018-11-21 PROCEDURE — 73080 X-RAY EXAM OF ELBOW: CPT

## 2018-11-21 RX ORDER — ACETAMINOPHEN 500 MG
500 TABLET ORAL 4 TIMES DAILY PRN
Qty: 50 TABLET | Refills: 0 | Status: SHIPPED | OUTPATIENT
Start: 2018-11-21

## 2018-11-21 RX ORDER — KETOROLAC TROMETHAMINE 30 MG/ML
30 INJECTION, SOLUTION INTRAMUSCULAR; INTRAVENOUS ONCE
Status: COMPLETED | OUTPATIENT
Start: 2018-11-21 | End: 2018-11-21

## 2018-11-21 RX ORDER — IBUPROFEN 400 MG/1
400 TABLET ORAL EVERY 6 HOURS PRN
Qty: 20 TABLET | Refills: 0 | Status: SHIPPED | OUTPATIENT
Start: 2018-11-21 | End: 2019-09-09

## 2018-11-21 RX ADMIN — KETOROLAC TROMETHAMINE 30 MG: 30 INJECTION, SOLUTION INTRAMUSCULAR at 19:48

## 2018-11-21 ASSESSMENT — PAIN SCALES - GENERAL
PAINLEVEL_OUTOF10: 10
PAINLEVEL_OUTOF10: 10
PAINLEVEL_OUTOF10: 5
PAINLEVEL_OUTOF10: 3

## 2018-11-21 ASSESSMENT — PAIN DESCRIPTION - ORIENTATION: ORIENTATION: LEFT

## 2018-11-21 ASSESSMENT — PAIN DESCRIPTION - LOCATION
LOCATION: ELBOW
LOCATION: ARM

## 2018-11-21 ASSESSMENT — PAIN DESCRIPTION - PAIN TYPE: TYPE: ACUTE PAIN

## 2018-11-21 ASSESSMENT — PAIN DESCRIPTION - FREQUENCY: FREQUENCY: CONTINUOUS

## 2018-11-28 DIAGNOSIS — E03.9 ACQUIRED HYPOTHYROIDISM: ICD-10-CM

## 2018-11-29 RX ORDER — LEVOTHYROXINE SODIUM 0.05 MG/1
TABLET ORAL
Qty: 30 TABLET | Refills: 11 | Status: SHIPPED | OUTPATIENT
Start: 2018-11-29 | End: 2019-11-01 | Stop reason: SDUPTHER

## 2018-12-28 DIAGNOSIS — I10 ESSENTIAL HYPERTENSION: ICD-10-CM

## 2018-12-28 RX ORDER — CHOLECALCIFEROL (VITAMIN D3) 125 MCG
CAPSULE ORAL
Qty: 30 TABLET | Refills: 5 | Status: SHIPPED | OUTPATIENT
Start: 2018-12-28 | End: 2019-05-28 | Stop reason: SDUPTHER

## 2018-12-28 RX ORDER — HYDROCHLOROTHIAZIDE 25 MG/1
TABLET ORAL
Qty: 30 TABLET | Refills: 2 | Status: SHIPPED | OUTPATIENT
Start: 2018-12-28 | End: 2019-04-29 | Stop reason: SDUPTHER

## 2018-12-28 NOTE — TELEPHONE ENCOUNTER
Patient requesting a medication refill.   Medication: HCTZ and Vitamin D3  Frequency: 1 tab daily  Last filled on: 12/28/18  [facility]  Next office visit: 3/18/19  Last regular office visit: 9/14/18

## 2019-04-29 DIAGNOSIS — I10 ESSENTIAL HYPERTENSION: ICD-10-CM

## 2019-04-29 RX ORDER — METOPROLOL TARTRATE 100 MG/1
TABLET ORAL
Qty: 60 TABLET | Refills: 2 | Status: SHIPPED | OUTPATIENT
Start: 2019-04-29 | End: 2019-07-26 | Stop reason: SDUPTHER

## 2019-04-29 RX ORDER — HYDROCHLOROTHIAZIDE 25 MG/1
TABLET ORAL
Qty: 30 TABLET | Refills: 2 | Status: SHIPPED | OUTPATIENT
Start: 2019-04-29 | End: 2019-07-26 | Stop reason: SDUPTHER

## 2019-05-28 NOTE — TELEPHONE ENCOUNTER
Patient requesting a medication refill.   Medication vitamin D3  Dosage 2000 units  Frequencydaily  [facility] 12/28/18  [facility]  Next office visit6/3/19  Last regular office visit9/14/18

## 2019-05-30 RX ORDER — CHOLECALCIFEROL (VITAMIN D3) 50 MCG
TABLET ORAL
Qty: 30 TABLET | Refills: 5 | Status: SHIPPED | OUTPATIENT
Start: 2019-05-30 | End: 2020-01-02

## 2019-07-05 RX ORDER — AMITRIPTYLINE HYDROCHLORIDE 25 MG/1
TABLET, FILM COATED ORAL
Qty: 60 TABLET | Refills: 11 | Status: SHIPPED | OUTPATIENT
Start: 2019-07-05 | End: 2020-07-14

## 2019-07-05 RX ORDER — VENLAFAXINE HYDROCHLORIDE 75 MG/1
75 CAPSULE, EXTENDED RELEASE ORAL DAILY
Qty: 90 CAPSULE | Refills: 2 | Status: SHIPPED | OUTPATIENT
Start: 2019-07-05 | End: 2020-03-30

## 2019-07-26 DIAGNOSIS — I10 ESSENTIAL HYPERTENSION: ICD-10-CM

## 2019-07-26 RX ORDER — HYDROCHLOROTHIAZIDE 25 MG/1
TABLET ORAL
Qty: 30 TABLET | Refills: 2 | Status: SHIPPED | OUTPATIENT
Start: 2019-07-26 | End: 2019-10-26 | Stop reason: SDUPTHER

## 2019-07-26 RX ORDER — METOPROLOL TARTRATE 100 MG/1
TABLET ORAL
Qty: 60 TABLET | Refills: 2 | Status: SHIPPED | OUTPATIENT
Start: 2019-07-26 | End: 2019-09-09

## 2019-09-09 ENCOUNTER — OFFICE VISIT (OUTPATIENT)
Dept: INTERNAL MEDICINE CLINIC | Age: 59
End: 2019-09-09
Payer: COMMERCIAL

## 2019-09-09 VITALS
DIASTOLIC BLOOD PRESSURE: 90 MMHG | RESPIRATION RATE: 16 BRPM | WEIGHT: 223.4 LBS | OXYGEN SATURATION: 97 % | HEIGHT: 64 IN | SYSTOLIC BLOOD PRESSURE: 170 MMHG | TEMPERATURE: 99.3 F | HEART RATE: 64 BPM | BODY MASS INDEX: 38.14 KG/M2

## 2019-09-09 DIAGNOSIS — R73.9 HYPERGLYCEMIA: ICD-10-CM

## 2019-09-09 DIAGNOSIS — E03.9 ACQUIRED HYPOTHYROIDISM: ICD-10-CM

## 2019-09-09 DIAGNOSIS — I10 ESSENTIAL HYPERTENSION: ICD-10-CM

## 2019-09-09 DIAGNOSIS — I10 ESSENTIAL HYPERTENSION: Primary | ICD-10-CM

## 2019-09-09 DIAGNOSIS — Z11.59 ENCOUNTER FOR HEPATITIS C SCREENING TEST FOR LOW RISK PATIENT: ICD-10-CM

## 2019-09-09 LAB
A/G RATIO: 1.8 (ref 1.1–2.2)
ALBUMIN SERPL-MCNC: 4.7 G/DL (ref 3.4–5)
ALP BLD-CCNC: 97 U/L (ref 40–129)
ALT SERPL-CCNC: 23 U/L (ref 10–40)
ANION GAP SERPL CALCULATED.3IONS-SCNC: 17 MMOL/L (ref 3–16)
AST SERPL-CCNC: 20 U/L (ref 15–37)
BILIRUB SERPL-MCNC: 0.5 MG/DL (ref 0–1)
BUN BLDV-MCNC: 12 MG/DL (ref 7–20)
CALCIUM SERPL-MCNC: 9.8 MG/DL (ref 8.3–10.6)
CHLORIDE BLD-SCNC: 94 MMOL/L (ref 99–110)
CHOLESTEROL, TOTAL: 180 MG/DL (ref 0–199)
CO2: 26 MMOL/L (ref 21–32)
CREAT SERPL-MCNC: 0.6 MG/DL (ref 0.6–1.1)
GFR AFRICAN AMERICAN: >60
GFR NON-AFRICAN AMERICAN: >60
GLOBULIN: 2.6 G/DL
GLUCOSE BLD-MCNC: 137 MG/DL (ref 70–99)
HDLC SERPL-MCNC: 37 MG/DL (ref 40–60)
HEPATITIS C ANTIBODY INTERPRETATION: NORMAL
LDL CHOLESTEROL CALCULATED: 91 MG/DL
POTASSIUM SERPL-SCNC: 3.2 MMOL/L (ref 3.5–5.1)
SODIUM BLD-SCNC: 137 MMOL/L (ref 136–145)
TOTAL PROTEIN: 7.3 G/DL (ref 6.4–8.2)
TRIGL SERPL-MCNC: 258 MG/DL (ref 0–150)
TSH REFLEX FT4: 1.43 UIU/ML (ref 0.27–4.2)
VLDLC SERPL CALC-MCNC: 52 MG/DL

## 2019-09-09 PROCEDURE — 99214 OFFICE O/P EST MOD 30 MIN: CPT | Performed by: INTERNAL MEDICINE

## 2019-09-09 RX ORDER — METOPROLOL TARTRATE 100 MG/1
100 TABLET ORAL 2 TIMES DAILY
Qty: 60 TABLET | Refills: 2 | Status: SHIPPED | OUTPATIENT
Start: 2019-09-09

## 2019-09-09 RX ORDER — AMLODIPINE BESYLATE 5 MG/1
5 TABLET ORAL DAILY
Qty: 30 TABLET | Refills: 5 | Status: ON HOLD | OUTPATIENT
Start: 2019-09-09 | End: 2021-03-28 | Stop reason: HOSPADM

## 2019-09-09 ASSESSMENT — ENCOUNTER SYMPTOMS
SHORTNESS OF BREATH: 0
ANAL BLEEDING: 0
CHOKING: 0
COLOR CHANGE: 0
STRIDOR: 0
RESPIRATORY NEGATIVE: 1
DIARRHEA: 0
ABDOMINAL DISTENTION: 0
APNEA: 0
RECTAL PAIN: 0
EYE REDNESS: 0
COUGH: 0
ABDOMINAL PAIN: 0
GASTROINTESTINAL NEGATIVE: 1
SINUS PAIN: 0
EYE DISCHARGE: 0
EYES NEGATIVE: 1
FACIAL SWELLING: 0
BACK PAIN: 0
CHEST TIGHTNESS: 0
ALLERGIC/IMMUNOLOGIC NEGATIVE: 1
VOICE CHANGE: 0
BLOOD IN STOOL: 0
RHINORRHEA: 0
NAUSEA: 0
TROUBLE SWALLOWING: 0
CONSTIPATION: 0
PHOTOPHOBIA: 0
SORE THROAT: 0
EYE PAIN: 0
EYE ITCHING: 0
WHEEZING: 0
VOMITING: 0
SINUS PRESSURE: 0

## 2019-09-09 ASSESSMENT — PATIENT HEALTH QUESTIONNAIRE - PHQ9
2. FEELING DOWN, DEPRESSED OR HOPELESS: 0
SUM OF ALL RESPONSES TO PHQ QUESTIONS 1-9: 0
1. LITTLE INTEREST OR PLEASURE IN DOING THINGS: 0
SUM OF ALL RESPONSES TO PHQ9 QUESTIONS 1 & 2: 0
SUM OF ALL RESPONSES TO PHQ QUESTIONS 1-9: 0

## 2019-09-09 NOTE — PROGRESS NOTES
Chief Complaint   Patient presents with    Hypertension     BP and med check - Blurry vision L. eye  seen at [facility] on 08.22.2019       HPI: Here for htn followup and management of multiple chronic conditions as per the active problems list below, which I reviewed and updated with the patient today. States doing well with no new concerns except if noted below. Is working with [facility] about LEFT visiton changes (blurring)--no diagnosis given, however. I have reviewed the chart notes available from myself and other providers. I have addressed all activeproblems listed below, and created or updated the problems list as needed. I have reviewed and updated the problems list in detail with patient. Patient Active Problem List   Diagnosis    Obesity (BMI 30-39. 9)    Hypertriglyceridemia    Lipoma of torso    Hot flashes    Hyperglycemia    Acquired hypothyroidism    Essential hypertension    Allergic rhinitis    Status post hysterectomy    headache       No problem-specific Assessment & Plan notes found for this encounter.       Discussed all labs, other tests, and imaging if available   Lab Results   Component Value Date    WBC 13.9 (H) 02/18/2013    HGB 13.5 02/18/2013    HCT 41.5 02/18/2013    MCV 84.3 02/18/2013     02/18/2013    LYMPHOPCT 3.8 (L) 02/18/2013    RBC 4.93 02/18/2013    MCH 27.3 02/18/2013    MCHC 32.4 02/18/2013    RDW 13.1 02/18/2013     Lab Results   Component Value Date     09/09/2019    K 3.2 (L) 09/09/2019    CL 94 (L) 09/09/2019    CO2 26 09/09/2019    BUN 12 09/09/2019    CREATININE 0.6 09/09/2019    GLUCOSE 137 (H) 09/09/2019    CALCIUM 9.8 09/09/2019    PROT 7.3 09/09/2019    LABALBU 4.7 09/09/2019    BILITOT 0.5 09/09/2019    ALKPHOS 97 09/09/2019    AST 20 09/09/2019    ALT 23 09/09/2019    LABGLOM >60 09/09/2019    GFRAA >60 09/09/2019    AGRATIO 1.8 09/09/2019    GLOB 2.6 09/09/2019     Lab Results   Component Value Date    TRIG 258 (H) 09/09/2019    TRIG 225 reviewed, as documented by MA    Past Medical History:   Diagnosis Date    Anxiety     GERD (gastroesophageal reflux disease)     Hypertension     Hypothyroidism     Polyp of colon        Past Surgical History:   Procedure Laterality Date    COLONOSCOPY      COLONOSCOPY  07/13/2018    HYSTERECTOMY  2010       Social History     Socioeconomic History    Marital status:      Spouse name: Not on file    Number of children: Not on file    Years of education: Not on file    Highest education level: Not on file   Occupational History    Not on file   Social Needs    Financial resource strain: Not on file    Food insecurity:     Worry: Not on file     Inability: Not on file    Transportation needs:     Medical: Not on file     Non-medical: Not on file   Tobacco Use    Smoking status: Never Smoker    Smokeless tobacco: Never Used   Substance and Sexual Activity    Alcohol use: Yes     Comment: soc    Drug use: No    Sexual activity: Yes     Partners: Male   Lifestyle    Physical activity:     Days per week: Not on file     Minutes per session: Not on file    Stress: Not on file   Relationships    Social connections:     Talks on phone: Not on file     Gets together: Not on file     Attends Cheondoism service: Not on file     Active member of club or organization: Not on file     Attends meetings of clubs or organizations: Not on file     Relationship status: Not on file    Intimate partner violence:     Fear of current or ex partner: Not on file     Emotionally abused: Not on file     Physically abused: Not on file     Forced sexual activity: Not on file   Other Topics Concern    Not on file   Social History Narrative    Not on file       Family History   Problem Relation Age of Onset    High Blood Pressure Mother     Cancer Mother     Kidney Disease Mother     High Blood Pressure Father     Cancer Father         adrenal         Health Maintenance Due   Topic Date Due    Shingles

## 2019-09-10 LAB
ESTIMATED AVERAGE GLUCOSE: 122.6 MG/DL
HBA1C MFR BLD: 5.9 %

## 2019-09-30 ENCOUNTER — OFFICE VISIT (OUTPATIENT)
Dept: INTERNAL MEDICINE CLINIC | Age: 59
End: 2019-09-30
Payer: COMMERCIAL

## 2019-09-30 VITALS
BODY MASS INDEX: 38.17 KG/M2 | HEART RATE: 64 BPM | WEIGHT: 223.6 LBS | TEMPERATURE: 99.1 F | OXYGEN SATURATION: 96 % | HEIGHT: 64 IN | DIASTOLIC BLOOD PRESSURE: 83 MMHG | SYSTOLIC BLOOD PRESSURE: 137 MMHG | RESPIRATION RATE: 16 BRPM

## 2019-09-30 DIAGNOSIS — D17.23 LIPOMA OF RIGHT LOWER EXTREMITY: ICD-10-CM

## 2019-09-30 DIAGNOSIS — M25.522 LEFT ELBOW PAIN: ICD-10-CM

## 2019-09-30 DIAGNOSIS — I10 ESSENTIAL HYPERTENSION: Primary | ICD-10-CM

## 2019-09-30 DIAGNOSIS — E87.6 HYPOKALEMIA: ICD-10-CM

## 2019-09-30 PROCEDURE — 90686 IIV4 VACC NO PRSV 0.5 ML IM: CPT | Performed by: INTERNAL MEDICINE

## 2019-09-30 PROCEDURE — 99214 OFFICE O/P EST MOD 30 MIN: CPT | Performed by: INTERNAL MEDICINE

## 2019-09-30 PROCEDURE — 90471 IMMUNIZATION ADMIN: CPT | Performed by: INTERNAL MEDICINE

## 2019-10-17 ENCOUNTER — HOSPITAL ENCOUNTER (OUTPATIENT)
Dept: WOMENS IMAGING | Age: 59
Discharge: HOME | End: 2019-10-17
Payer: COMMERCIAL

## 2019-10-17 DIAGNOSIS — Z12.31 VISIT FOR SCREENING MAMMOGRAM: ICD-10-CM

## 2019-10-17 PROCEDURE — 77067 SCR MAMMO BI INCL CAD: CPT

## 2019-10-26 DIAGNOSIS — I10 ESSENTIAL HYPERTENSION: ICD-10-CM

## 2019-10-29 RX ORDER — HYDROCHLOROTHIAZIDE 25 MG/1
TABLET ORAL
Qty: 30 TABLET | Refills: 0 | Status: ON HOLD | OUTPATIENT
Start: 2019-10-29 | End: 2021-03-28 | Stop reason: HOSPADM

## 2019-11-01 DIAGNOSIS — E03.9 ACQUIRED HYPOTHYROIDISM: ICD-10-CM

## 2019-11-01 RX ORDER — LEVOTHYROXINE SODIUM 0.05 MG/1
TABLET ORAL
Qty: 30 TABLET | Refills: 11 | Status: SHIPPED | OUTPATIENT
Start: 2019-11-01

## 2019-11-15 ENCOUNTER — TELEPHONE (OUTPATIENT)
Dept: ADMINISTRATIVE | Age: 59
End: 2019-11-15

## 2019-11-18 PROBLEM — K21.9 GASTROESOPHAGEAL REFLUX DISEASE WITHOUT ESOPHAGITIS: Status: ACTIVE | Noted: 2019-11-18

## 2019-12-23 DIAGNOSIS — I10 ESSENTIAL HYPERTENSION: ICD-10-CM

## 2019-12-26 RX ORDER — METOPROLOL TARTRATE 100 MG/1
100 TABLET ORAL 2 TIMES DAILY
Qty: 60 TABLET | Refills: 2 | OUTPATIENT
Start: 2019-12-26

## 2020-01-02 RX ORDER — CHOLECALCIFEROL (VITAMIN D3) 125 MCG
CAPSULE ORAL
Qty: 30 TABLET | Refills: 5 | Status: SHIPPED | OUTPATIENT
Start: 2020-01-02 | End: 2020-06-29

## 2020-01-24 RX ORDER — METOPROLOL TARTRATE 100 MG/1
100 TABLET ORAL 2 TIMES DAILY
Qty: 60 TABLET | Refills: 2 | OUTPATIENT
Start: 2020-01-24

## 2020-03-30 RX ORDER — VENLAFAXINE HYDROCHLORIDE 75 MG/1
75 CAPSULE, EXTENDED RELEASE ORAL DAILY
Qty: 90 CAPSULE | Refills: 2 | Status: SHIPPED | OUTPATIENT
Start: 2020-03-30

## 2020-04-30 RX ORDER — LOSARTAN POTASSIUM 100 MG/1
100 TABLET ORAL DAILY
Qty: 30 TABLET | Refills: 11 | Status: ON HOLD | OUTPATIENT
Start: 2020-04-30 | End: 2021-03-28 | Stop reason: HOSPADM

## 2020-05-29 RX ORDER — CHOLECALCIFEROL (VITAMIN D3) 125 MCG
CAPSULE ORAL
Qty: 30 TABLET | Refills: 5 | OUTPATIENT
Start: 2020-05-29

## 2020-06-29 RX ORDER — CHOLECALCIFEROL (VITAMIN D3) 125 MCG
CAPSULE ORAL
Qty: 30 TABLET | Refills: 5 | Status: SHIPPED | OUTPATIENT
Start: 2020-06-29 | End: 2021-03-21

## 2020-07-14 RX ORDER — AMITRIPTYLINE HYDROCHLORIDE 25 MG/1
TABLET, FILM COATED ORAL
Qty: 60 TABLET | Refills: 11 | Status: SHIPPED | OUTPATIENT
Start: 2020-07-14

## 2020-11-05 ENCOUNTER — HOSPITAL ENCOUNTER (OUTPATIENT)
Dept: WOMENS IMAGING | Age: 60
Discharge: HOME | End: 2020-11-05
Payer: COMMERCIAL

## 2020-11-05 PROCEDURE — 77067 SCR MAMMO BI INCL CAD: CPT

## 2020-11-09 LAB
AVERAGE GLUCOSE: NORMAL
HBA1C MFR BLD: 6 %

## 2020-11-10 RX ORDER — ESOMEPRAZOLE 20 MG/1
CAPSULE, DELAYED RELEASE ORAL
Qty: 180 CAPSULE | Refills: 0 | Status: CANCELLED | OUTPATIENT
Start: 2020-11-10

## 2020-11-10 NOTE — TELEPHONE ENCOUNTER
Requested Prescriptions     Pending Prescriptions Disp Refills    esomeprazole (GNP ESOMEPRAZOLE MAGNESIUM) 20 MG delayed release capsule [Pharmacy Med Name: GNP ESOMEPRAZOLE MAGNESIUM 20 CAP] 180 capsule 3     Sig: TAKE 2 CAPSULES BY MOUTH EVERY MORNING BEFORE BREAKFAST   Patient requesting a medication refill.   [facility]  Next office visit: Visit date not found  Last regular office visit: 9/30/2019

## 2020-11-30 RX ORDER — CHOLECALCIFEROL (VITAMIN D3) 125 MCG
CAPSULE ORAL
Qty: 30 TABLET | Refills: 5 | OUTPATIENT
Start: 2020-11-30

## 2020-11-30 NOTE — TELEPHONE ENCOUNTER
Requested Prescriptions     Pending Prescriptions Disp Refills    Cholecalciferol (VITAMIN D3) 50 MCG (2000 UT) TABS [Pharmacy Med Name: VIT D-3 2,000 UNIT 50MCG TB 50 MCG TAB] 30 tablet 5     Sig: TAKE 1 TABLET BY MOUTH EVERY DAY    esomeprazole (GNP ESOMEPRAZOLE MAGNESIUM) 20 MG delayed release capsule [Pharmacy Med Name: GNP ESOMEPRAZOLE MAGNESIUM 20 CAP] 180 capsule 3     Sig: TAKE 2 CAPSULES BY MOUTH EVERY MORNING BEFORE BREAKFAST   Patient requesting a medication refill.   Last filled on: 11/30/20  Pharmacy: [id number]  Next office visit: Visit date not found  Last regular office visit: 9/30/2019

## 2020-12-22 RX ORDER — CHOLECALCIFEROL (VITAMIN D3) 125 MCG
CAPSULE ORAL
Qty: 30 TABLET | Refills: 2 | OUTPATIENT
Start: 2020-12-22

## 2020-12-22 NOTE — TELEPHONE ENCOUNTER
Requested Prescriptions     Pending Prescriptions Disp Refills    Cholecalciferol (VITAMIN D3) 50 MCG (2000 UT) TABS [Pharmacy Med Name: VIT D-3 2,000 UNIT 50MCG TB 50 MCG TAB] 30 tablet 2     Sig: TAKE 1 TABLET BY MOUTH EVERY DAY   Patient requesting a medication refill. [facility]  Next office visit: Visit date not found  Last regular office visit: 9/30/2019    Needs appointment.

## 2021-03-21 ENCOUNTER — APPOINTMENT (OUTPATIENT)
Dept: GENERAL RADIOLOGY | Age: 61
DRG: 177 | End: 2021-03-21
Payer: COMMERCIAL

## 2021-03-21 ENCOUNTER — HOSPITAL ENCOUNTER (INPATIENT)
Age: 61
LOS: 6 days | Discharge: HOME | DRG: 177 | End: 2021-03-28
Attending: INTERNAL MEDICINE | Admitting: INTERNAL MEDICINE
Payer: COMMERCIAL

## 2021-03-21 DIAGNOSIS — E87.6 HYPOKALEMIA: ICD-10-CM

## 2021-03-21 DIAGNOSIS — R09.02 HYPOXIA: ICD-10-CM

## 2021-03-21 DIAGNOSIS — J96.00 ACUTE RESPIRATORY FAILURE DUE TO COVID-19 (HCC): Primary | ICD-10-CM

## 2021-03-21 DIAGNOSIS — E87.1 HYPONATREMIA: ICD-10-CM

## 2021-03-21 DIAGNOSIS — U07.1 ACUTE RESPIRATORY FAILURE DUE TO COVID-19 (HCC): Primary | ICD-10-CM

## 2021-03-21 LAB
BASOPHILS ABSOLUTE: 0 K/UL (ref 0–0.2)
BASOPHILS RELATIVE PERCENT: 0.1 %
EOSINOPHILS ABSOLUTE: 0 K/UL (ref 0–0.6)
EOSINOPHILS RELATIVE PERCENT: 0 %
HCT VFR BLD CALC: 33.6 % (ref 36–48)
HEMOGLOBIN: 12.2 G/DL (ref 12–16)
LACTIC ACID: 1 MMOL/L (ref 0.4–2)
LYMPHOCYTES ABSOLUTE: 0.6 K/UL (ref 1–5.1)
LYMPHOCYTES RELATIVE PERCENT: 10.8 %
MCH RBC QN AUTO: 29.2 PG (ref 26–34)
MCHC RBC AUTO-ENTMCNC: 36.3 G/DL (ref 31–36)
MCV RBC AUTO: 80.6 FL (ref 80–100)
MONOCYTES ABSOLUTE: 0.6 K/UL (ref 0–1.3)
MONOCYTES RELATIVE PERCENT: 10.8 %
NEUTROPHILS ABSOLUTE: 4.5 K/UL (ref 1.7–7.7)
NEUTROPHILS RELATIVE PERCENT: 78.3 %
PDW BLD-RTO: 13.1 % (ref 12.4–15.4)
PLATELET # BLD: 248 K/UL (ref 135–450)
PMV BLD AUTO: 6.8 FL (ref 5–10.5)
RBC # BLD: 4.17 M/UL (ref 4–5.2)
TROPONIN: <0.01 NG/ML
WBC # BLD: 5.8 K/UL (ref 4–11)

## 2021-03-21 PROCEDURE — 84484 ASSAY OF TROPONIN QUANT: CPT

## 2021-03-21 PROCEDURE — 85025 COMPLETE CBC W/AUTO DIFF WBC: CPT

## 2021-03-21 PROCEDURE — 93005 ELECTROCARDIOGRAM TRACING: CPT | Performed by: PHYSICIAN ASSISTANT

## 2021-03-21 PROCEDURE — 86870 RBC ANTIBODY IDENTIFICATION: CPT

## 2021-03-21 PROCEDURE — 6360000002 HC RX W HCPCS: Performed by: PHYSICIAN ASSISTANT

## 2021-03-21 PROCEDURE — 86902 BLOOD TYPE ANTIGEN DONOR EA: CPT

## 2021-03-21 PROCEDURE — 86880 COOMBS TEST DIRECT: CPT

## 2021-03-21 PROCEDURE — 80053 COMPREHEN METABOLIC PANEL: CPT

## 2021-03-21 PROCEDURE — 86905 BLOOD TYPING RBC ANTIGENS: CPT

## 2021-03-21 PROCEDURE — 6370000000 HC RX 637 (ALT 250 FOR IP): Performed by: PHYSICIAN ASSISTANT

## 2021-03-21 PROCEDURE — 96374 THER/PROPH/DIAG INJ IV PUSH: CPT

## 2021-03-21 PROCEDURE — 87040 BLOOD CULTURE FOR BACTERIA: CPT

## 2021-03-21 PROCEDURE — 86922 COMPATIBILITY TEST ANTIGLOB: CPT

## 2021-03-21 PROCEDURE — 86901 BLOOD TYPING SEROLOGIC RH(D): CPT

## 2021-03-21 PROCEDURE — 83605 ASSAY OF LACTIC ACID: CPT

## 2021-03-21 PROCEDURE — 2700000000 HC OXYGEN THERAPY PER DAY

## 2021-03-21 PROCEDURE — 86900 BLOOD TYPING SEROLOGIC ABO: CPT

## 2021-03-21 PROCEDURE — 99285 EMERGENCY DEPT VISIT HI MDM: CPT

## 2021-03-21 PROCEDURE — 71045 X-RAY EXAM CHEST 1 VIEW: CPT

## 2021-03-21 PROCEDURE — 86850 RBC ANTIBODY SCREEN: CPT

## 2021-03-21 RX ORDER — BENZONATATE 100 MG/1
200 CAPSULE ORAL ONCE
Status: COMPLETED | OUTPATIENT
Start: 2021-03-21 | End: 2021-03-21

## 2021-03-21 RX ORDER — ALBUTEROL SULFATE 90 UG/1
2 AEROSOL, METERED RESPIRATORY (INHALATION) EVERY 6 HOURS PRN
Status: DISCONTINUED | OUTPATIENT
Start: 2021-03-21 | End: 2021-03-28 | Stop reason: HOSPADM

## 2021-03-21 RX ORDER — ROSUVASTATIN CALCIUM 5 MG/1
5 TABLET, COATED ORAL DAILY
COMMUNITY

## 2021-03-21 RX ORDER — BENZONATATE 200 MG/1
200 CAPSULE ORAL 3 TIMES DAILY PRN
COMMUNITY

## 2021-03-21 RX ORDER — DEXAMETHASONE SODIUM PHOSPHATE 10 MG/ML
10 INJECTION, SOLUTION INTRAMUSCULAR; INTRAVENOUS ONCE
Status: COMPLETED | OUTPATIENT
Start: 2021-03-21 | End: 2021-03-21

## 2021-03-21 RX ORDER — POTASSIUM CHLORIDE 750 MG/1
10 TABLET, FILM COATED, EXTENDED RELEASE ORAL DAILY
COMMUNITY

## 2021-03-21 RX ORDER — POTASSIUM CHLORIDE 20 MEQ/1
40 TABLET, EXTENDED RELEASE ORAL ONCE
Status: COMPLETED | OUTPATIENT
Start: 2021-03-22 | End: 2021-03-22

## 2021-03-21 RX ADMIN — DEXAMETHASONE SODIUM PHOSPHATE 10 MG: 10 INJECTION, SOLUTION INTRAMUSCULAR; INTRAVENOUS at 23:18

## 2021-03-21 RX ADMIN — BENZONATATE 200 MG: 100 CAPSULE ORAL at 23:18

## 2021-03-21 ASSESSMENT — ENCOUNTER SYMPTOMS
COUGH: 1
ABDOMINAL PAIN: 0
COLOR CHANGE: 0
DIARRHEA: 1
SHORTNESS OF BREATH: 1
VOMITING: 0

## 2021-03-22 PROBLEM — J96.01 ACUTE RESPIRATORY FAILURE WITH HYPOXIA (HCC): Status: ACTIVE | Noted: 2021-03-22

## 2021-03-22 PROBLEM — J96.00 ACUTE RESPIRATORY FAILURE DUE TO COVID-19 (HCC): Status: ACTIVE | Noted: 2021-03-22

## 2021-03-22 PROBLEM — J12.82 PNEUMONIA DUE TO COVID-19 VIRUS: Status: ACTIVE | Noted: 2021-03-22

## 2021-03-22 PROBLEM — U07.1 PNEUMONIA DUE TO COVID-19 VIRUS: Status: ACTIVE | Noted: 2021-03-22

## 2021-03-22 LAB
A/G RATIO: 1 (ref 1.1–2.2)
A/G RATIO: 1.1 (ref 1.1–2.2)
ABO/RH: NORMAL
ALBUMIN SERPL-MCNC: 3.3 G/DL (ref 3.4–5)
ALBUMIN SERPL-MCNC: 3.5 G/DL (ref 3.4–5)
ALBUMIN SERPL-MCNC: 3.5 G/DL (ref 3.4–5)
ALP BLD-CCNC: 68 U/L (ref 40–129)
ALP BLD-CCNC: 75 U/L (ref 40–129)
ALT SERPL-CCNC: 22 U/L (ref 10–40)
ALT SERPL-CCNC: 22 U/L (ref 10–40)
ANION GAP SERPL CALCULATED.3IONS-SCNC: 12 MMOL/L (ref 3–16)
ANION GAP SERPL CALCULATED.3IONS-SCNC: 14 MMOL/L (ref 3–16)
ANION GAP SERPL CALCULATED.3IONS-SCNC: 8 MMOL/L (ref 3–16)
ANTIBODY IDENTIFICATION: NORMAL
ANTIBODY SCREEN: NORMAL
AST SERPL-CCNC: 32 U/L (ref 15–37)
AST SERPL-CCNC: 38 U/L (ref 15–37)
BACTERIA: ABNORMAL /HPF
BASOPHILS ABSOLUTE: 0 K/UL (ref 0–0.2)
BASOPHILS RELATIVE PERCENT: 0.2 %
BILIRUB SERPL-MCNC: 0.6 MG/DL (ref 0–1)
BILIRUB SERPL-MCNC: 0.6 MG/DL (ref 0–1)
BILIRUBIN URINE: NEGATIVE
BLOOD, URINE: NEGATIVE
BUN BLDV-MCNC: 10 MG/DL (ref 7–20)
BUN BLDV-MCNC: 10 MG/DL (ref 7–20)
BUN BLDV-MCNC: 9 MG/DL (ref 7–20)
C (BIG) ANTIGEN: NORMAL
CALCIUM SERPL-MCNC: 8.2 MG/DL (ref 8.3–10.6)
CALCIUM SERPL-MCNC: 8.5 MG/DL (ref 8.3–10.6)
CALCIUM SERPL-MCNC: 8.7 MG/DL (ref 8.3–10.6)
CHLORIDE BLD-SCNC: 81 MMOL/L (ref 99–110)
CHLORIDE BLD-SCNC: 83 MMOL/L (ref 99–110)
CHLORIDE BLD-SCNC: 88 MMOL/L (ref 99–110)
CLARITY: ABNORMAL
CO2: 23 MMOL/L (ref 21–32)
CO2: 25 MMOL/L (ref 21–32)
CO2: 26 MMOL/L (ref 21–32)
COLOR: YELLOW
CREAT SERPL-MCNC: 0.5 MG/DL (ref 0.6–1.1)
CREAT SERPL-MCNC: 0.5 MG/DL (ref 0.6–1.1)
CREAT SERPL-MCNC: <0.5 MG/DL (ref 0.6–1.1)
DAT IGG CAPTURE: NORMAL
E (LITTLE) ANTIGEN: NORMAL
EKG ATRIAL RATE: 83 BPM
EKG DIAGNOSIS: NORMAL
EKG P-R INTERVAL: 162 MS
EKG Q-T INTERVAL: 390 MS
EKG QRS DURATION: 84 MS
EKG QTC CALCULATION (BAZETT): 458 MS
EKG R AXIS: 2 DEGREES
EKG T AXIS: 6 DEGREES
EKG VENTRICULAR RATE: 83 BPM
EOSINOPHILS ABSOLUTE: 0 K/UL (ref 0–0.6)
EOSINOPHILS RELATIVE PERCENT: 0 %
EPITHELIAL CELLS, UA: 12 /HPF (ref 0–5)
FERRITIN: 563.9 NG/ML (ref 15–150)
GFR AFRICAN AMERICAN: >60
GFR NON-AFRICAN AMERICAN: >60
GLOBULIN: 3.3 G/DL
GLOBULIN: 3.5 G/DL
GLUCOSE BLD-MCNC: 109 MG/DL (ref 70–99)
GLUCOSE BLD-MCNC: 194 MG/DL (ref 70–99)
GLUCOSE BLD-MCNC: 200 MG/DL (ref 70–99)
GLUCOSE URINE: NEGATIVE MG/DL
HCT VFR BLD CALC: 33.5 % (ref 36–48)
HEMOGLOBIN: 11.9 G/DL (ref 12–16)
HYALINE CASTS: 1 /LPF (ref 0–8)
K ANTIGEN: NORMAL
KETONES, URINE: ABNORMAL MG/DL
LEUKOCYTE ESTERASE, URINE: ABNORMAL
LYMPHOCYTES ABSOLUTE: 0.3 K/UL (ref 1–5.1)
LYMPHOCYTES RELATIVE PERCENT: 11.4 %
MCH RBC QN AUTO: 28.7 PG (ref 26–34)
MCHC RBC AUTO-ENTMCNC: 35.5 G/DL (ref 31–36)
MCV RBC AUTO: 80.8 FL (ref 80–100)
MICROSCOPIC EXAMINATION: YES
MONOCYTES ABSOLUTE: 0.2 K/UL (ref 0–1.3)
MONOCYTES RELATIVE PERCENT: 6 %
NEUTROPHILS ABSOLUTE: 2.2 K/UL (ref 1.7–7.7)
NEUTROPHILS RELATIVE PERCENT: 82.4 %
NITRITE, URINE: NEGATIVE
OSMOLALITY URINE: 233 MOSM/KG (ref 390–1070)
PDW BLD-RTO: 13 % (ref 12.4–15.4)
PH UA: 7 (ref 5–8)
PHOSPHORUS: 1.8 MG/DL (ref 2.5–4.9)
PLATELET # BLD: 250 K/UL (ref 135–450)
PMV BLD AUTO: 7.3 FL (ref 5–10.5)
POTASSIUM REFLEX MAGNESIUM: 3.8 MMOL/L (ref 3.5–5.1)
POTASSIUM SERPL-SCNC: 3.2 MMOL/L (ref 3.5–5.1)
POTASSIUM SERPL-SCNC: 3.6 MMOL/L (ref 3.5–5.1)
POTASSIUM, UR: 12.4 MMOL/L
PROCALCITONIN: 0.08 NG/ML (ref 0–0.15)
PROTEIN UA: NEGATIVE MG/DL
RBC # BLD: 4.15 M/UL (ref 4–5.2)
RBC UA: 2 /HPF (ref 0–4)
SODIUM BLD-SCNC: 118 MMOL/L (ref 136–145)
SODIUM BLD-SCNC: 120 MMOL/L (ref 136–145)
SODIUM BLD-SCNC: 122 MMOL/L (ref 136–145)
SODIUM URINE: <20 MMOL/L
SODIUM URINE: <20 MMOL/L
SPECIFIC GRAVITY UA: 1.01 (ref 1–1.03)
T4 FREE: 1.9 NG/DL (ref 0.9–1.8)
TOTAL PROTEIN: 6.8 G/DL (ref 6.4–8.2)
TOTAL PROTEIN: 7 G/DL (ref 6.4–8.2)
TSH REFLEX: 0.17 UIU/ML (ref 0.27–4.2)
URINE REFLEX TO CULTURE: ABNORMAL
URINE TYPE: ABNORMAL
UROBILINOGEN, URINE: 0.2 E.U./DL
WBC # BLD: 2.7 K/UL (ref 4–11)
WBC UA: 8 /HPF (ref 0–5)

## 2021-03-22 PROCEDURE — 6360000002 HC RX W HCPCS: Performed by: NURSE PRACTITIONER

## 2021-03-22 PROCEDURE — 2580000003 HC RX 258: Performed by: NURSE PRACTITIONER

## 2021-03-22 PROCEDURE — 85025 COMPLETE CBC W/AUTO DIFF WBC: CPT

## 2021-03-22 PROCEDURE — 83935 ASSAY OF URINE OSMOLALITY: CPT

## 2021-03-22 PROCEDURE — 93010 ELECTROCARDIOGRAM REPORT: CPT | Performed by: INTERNAL MEDICINE

## 2021-03-22 PROCEDURE — 87040 BLOOD CULTURE FOR BACTERIA: CPT

## 2021-03-22 PROCEDURE — 84439 ASSAY OF FREE THYROXINE: CPT

## 2021-03-22 PROCEDURE — 2580000003 HC RX 258: Performed by: PHYSICIAN ASSISTANT

## 2021-03-22 PROCEDURE — 2500000003 HC RX 250 WO HCPCS: Performed by: INTERNAL MEDICINE

## 2021-03-22 PROCEDURE — 82728 ASSAY OF FERRITIN: CPT

## 2021-03-22 PROCEDURE — 6370000000 HC RX 637 (ALT 250 FOR IP): Performed by: NURSE PRACTITIONER

## 2021-03-22 PROCEDURE — 2580000003 HC RX 258: Performed by: INTERNAL MEDICINE

## 2021-03-22 PROCEDURE — 84300 ASSAY OF URINE SODIUM: CPT

## 2021-03-22 PROCEDURE — 84145 PROCALCITONIN (PCT): CPT

## 2021-03-22 PROCEDURE — 6360000002 HC RX W HCPCS: Performed by: INTERNAL MEDICINE

## 2021-03-22 PROCEDURE — 81001 URINALYSIS AUTO W/SCOPE: CPT

## 2021-03-22 PROCEDURE — 84133 ASSAY OF URINE POTASSIUM: CPT

## 2021-03-22 PROCEDURE — XW033E5 INTRODUCTION OF REMDESIVIR ANTI-INFECTIVE INTO PERIPHERAL VEIN, PERCUTANEOUS APPROACH, NEW TECHNOLOGY GROUP 5: ICD-10-PCS | Performed by: INTERNAL MEDICINE

## 2021-03-22 PROCEDURE — 84443 ASSAY THYROID STIM HORMONE: CPT

## 2021-03-22 PROCEDURE — 80053 COMPREHEN METABOLIC PANEL: CPT

## 2021-03-22 PROCEDURE — 36415 COLL VENOUS BLD VENIPUNCTURE: CPT

## 2021-03-22 PROCEDURE — 2060000000 HC ICU INTERMEDIATE R&B

## 2021-03-22 PROCEDURE — 99223 1ST HOSP IP/OBS HIGH 75: CPT | Performed by: INTERNAL MEDICINE

## 2021-03-22 PROCEDURE — 87449 NOS EACH ORGANISM AG IA: CPT

## 2021-03-22 PROCEDURE — 94761 N-INVAS EAR/PLS OXIMETRY MLT: CPT

## 2021-03-22 PROCEDURE — 2700000000 HC OXYGEN THERAPY PER DAY

## 2021-03-22 PROCEDURE — 6370000000 HC RX 637 (ALT 250 FOR IP): Performed by: PHYSICIAN ASSISTANT

## 2021-03-22 RX ORDER — VENLAFAXINE HYDROCHLORIDE 37.5 MG/1
37.5 CAPSULE, EXTENDED RELEASE ORAL DAILY
Status: DISCONTINUED | OUTPATIENT
Start: 2021-03-22 | End: 2021-03-28 | Stop reason: HOSPADM

## 2021-03-22 RX ORDER — ROSUVASTATIN CALCIUM 10 MG/1
5 TABLET, COATED ORAL DAILY
Status: DISCONTINUED | OUTPATIENT
Start: 2021-03-22 | End: 2021-03-28 | Stop reason: HOSPADM

## 2021-03-22 RX ORDER — IVERMECTIN 3 MG/1
200 TABLET ORAL ONCE
Status: COMPLETED | OUTPATIENT
Start: 2021-03-22 | End: 2021-03-22

## 2021-03-22 RX ORDER — DEXAMETHASONE SODIUM PHOSPHATE 10 MG/ML
6 INJECTION, SOLUTION INTRAMUSCULAR; INTRAVENOUS EVERY 24 HOURS
Status: DISCONTINUED | OUTPATIENT
Start: 2021-03-22 | End: 2021-03-22

## 2021-03-22 RX ORDER — METOPROLOL TARTRATE 50 MG/1
100 TABLET, FILM COATED ORAL 2 TIMES DAILY
Status: DISCONTINUED | OUTPATIENT
Start: 2021-03-22 | End: 2021-03-28 | Stop reason: HOSPADM

## 2021-03-22 RX ORDER — HYDROCHLOROTHIAZIDE 25 MG/1
25 TABLET ORAL DAILY
Status: DISCONTINUED | OUTPATIENT
Start: 2021-03-22 | End: 2021-03-22

## 2021-03-22 RX ORDER — PROMETHAZINE HYDROCHLORIDE 25 MG/1
12.5 TABLET ORAL EVERY 6 HOURS PRN
Status: DISCONTINUED | OUTPATIENT
Start: 2021-03-22 | End: 2021-03-28 | Stop reason: HOSPADM

## 2021-03-22 RX ORDER — AMLODIPINE BESYLATE 10 MG/1
10 TABLET ORAL DAILY
Status: DISCONTINUED | OUTPATIENT
Start: 2021-03-22 | End: 2021-03-28 | Stop reason: HOSPADM

## 2021-03-22 RX ORDER — GUAIFENESIN/DEXTROMETHORPHAN 100-10MG/5
5 SYRUP ORAL EVERY 4 HOURS PRN
Status: DISCONTINUED | OUTPATIENT
Start: 2021-03-22 | End: 2021-03-28 | Stop reason: HOSPADM

## 2021-03-22 RX ORDER — LEVOTHYROXINE SODIUM 0.05 MG/1
50 TABLET ORAL DAILY
Status: DISCONTINUED | OUTPATIENT
Start: 2021-03-22 | End: 2021-03-28 | Stop reason: HOSPADM

## 2021-03-22 RX ORDER — ACETAMINOPHEN 650 MG/1
650 SUPPOSITORY RECTAL EVERY 6 HOURS PRN
Status: DISCONTINUED | OUTPATIENT
Start: 2021-03-22 | End: 2021-03-28 | Stop reason: HOSPADM

## 2021-03-22 RX ORDER — SODIUM CHLORIDE 9 MG/ML
INJECTION, SOLUTION INTRAVENOUS CONTINUOUS
Status: DISCONTINUED | OUTPATIENT
Start: 2021-03-22 | End: 2021-03-25

## 2021-03-22 RX ORDER — POLYETHYLENE GLYCOL 3350 17 G/17G
17 POWDER, FOR SOLUTION ORAL DAILY PRN
Status: DISCONTINUED | OUTPATIENT
Start: 2021-03-22 | End: 2021-03-28 | Stop reason: HOSPADM

## 2021-03-22 RX ORDER — LOSARTAN POTASSIUM 100 MG/1
100 TABLET ORAL DAILY
Status: DISCONTINUED | OUTPATIENT
Start: 2021-03-22 | End: 2021-03-23

## 2021-03-22 RX ORDER — BENZONATATE 100 MG/1
200 CAPSULE ORAL 3 TIMES DAILY PRN
Status: DISCONTINUED | OUTPATIENT
Start: 2021-03-22 | End: 2021-03-28 | Stop reason: HOSPADM

## 2021-03-22 RX ORDER — SODIUM CHLORIDE 0.9 % (FLUSH) 0.9 %
10 SYRINGE (ML) INJECTION PRN
Status: DISCONTINUED | OUTPATIENT
Start: 2021-03-22 | End: 2021-03-28 | Stop reason: HOSPADM

## 2021-03-22 RX ORDER — 0.9 % SODIUM CHLORIDE 0.9 %
500 INTRAVENOUS SOLUTION INTRAVENOUS ONCE
Status: COMPLETED | OUTPATIENT
Start: 2021-03-22 | End: 2021-03-22

## 2021-03-22 RX ORDER — ACETAMINOPHEN 325 MG/1
650 TABLET ORAL EVERY 6 HOURS PRN
Status: DISCONTINUED | OUTPATIENT
Start: 2021-03-22 | End: 2021-03-28 | Stop reason: HOSPADM

## 2021-03-22 RX ORDER — POTASSIUM CHLORIDE 750 MG/1
10 TABLET, FILM COATED, EXTENDED RELEASE ORAL DAILY
Status: DISCONTINUED | OUTPATIENT
Start: 2021-03-22 | End: 2021-03-23

## 2021-03-22 RX ORDER — SODIUM CHLORIDE 0.9 % (FLUSH) 0.9 %
10 SYRINGE (ML) INJECTION EVERY 12 HOURS SCHEDULED
Status: DISCONTINUED | OUTPATIENT
Start: 2021-03-22 | End: 2021-03-27

## 2021-03-22 RX ORDER — AZELASTINE 1 MG/ML
2 SPRAY, METERED NASAL DAILY PRN
Status: DISCONTINUED | OUTPATIENT
Start: 2021-03-22 | End: 2021-03-28 | Stop reason: HOSPADM

## 2021-03-22 RX ORDER — DEXAMETHASONE SODIUM PHOSPHATE 10 MG/ML
10 INJECTION, SOLUTION INTRAMUSCULAR; INTRAVENOUS EVERY 24 HOURS
Status: DISCONTINUED | OUTPATIENT
Start: 2021-03-22 | End: 2021-03-22

## 2021-03-22 RX ORDER — ONDANSETRON 2 MG/ML
4 INJECTION INTRAMUSCULAR; INTRAVENOUS EVERY 6 HOURS PRN
Status: DISCONTINUED | OUTPATIENT
Start: 2021-03-22 | End: 2021-03-28 | Stop reason: HOSPADM

## 2021-03-22 RX ORDER — 0.9 % SODIUM CHLORIDE 0.9 %
30 INTRAVENOUS SOLUTION INTRAVENOUS PRN
Status: DISCONTINUED | OUTPATIENT
Start: 2021-03-22 | End: 2021-03-28 | Stop reason: HOSPADM

## 2021-03-22 RX ADMIN — SODIUM CHLORIDE: 9 INJECTION, SOLUTION INTRAVENOUS at 03:11

## 2021-03-22 RX ADMIN — SODIUM CHLORIDE 500 ML: 9 INJECTION, SOLUTION INTRAVENOUS at 00:22

## 2021-03-22 RX ADMIN — AMLODIPINE BESYLATE 10 MG: 10 TABLET ORAL at 08:16

## 2021-03-22 RX ADMIN — LOSARTAN POTASSIUM 100 MG: 100 TABLET, FILM COATED ORAL at 08:16

## 2021-03-22 RX ADMIN — ENOXAPARIN SODIUM 40 MG: 40 INJECTION SUBCUTANEOUS at 03:11

## 2021-03-22 RX ADMIN — BENZOCAINE AND MENTHOL 1 LOZENGE: 15; 3.6 LOZENGE ORAL at 03:11

## 2021-03-22 RX ADMIN — POTASSIUM CHLORIDE 10 MEQ: 750 TABLET, FILM COATED, EXTENDED RELEASE ORAL at 08:16

## 2021-03-22 RX ADMIN — BENZOCAINE AND MENTHOL 1 LOZENGE: 15; 3.6 LOZENGE ORAL at 11:43

## 2021-03-22 RX ADMIN — METOPROLOL TARTRATE 100 MG: 50 TABLET, FILM COATED ORAL at 08:16

## 2021-03-22 RX ADMIN — BENZOCAINE AND MENTHOL 1 LOZENGE: 15; 3.6 LOZENGE ORAL at 05:49

## 2021-03-22 RX ADMIN — REMDESIVIR 200 MG: 100 INJECTION, POWDER, LYOPHILIZED, FOR SOLUTION INTRAVENOUS at 13:36

## 2021-03-22 RX ADMIN — ENOXAPARIN SODIUM 40 MG: 40 INJECTION SUBCUTANEOUS at 20:30

## 2021-03-22 RX ADMIN — ENOXAPARIN SODIUM 40 MG: 40 INJECTION SUBCUTANEOUS at 08:17

## 2021-03-22 RX ADMIN — BENZONATATE 200 MG: 100 CAPSULE ORAL at 13:41

## 2021-03-22 RX ADMIN — POTASSIUM CHLORIDE 40 MEQ: 1500 TABLET, EXTENDED RELEASE ORAL at 00:19

## 2021-03-22 RX ADMIN — VENLAFAXINE HYDROCHLORIDE 37.5 MG: 37.5 CAPSULE, EXTENDED RELEASE ORAL at 08:25

## 2021-03-22 RX ADMIN — BENZOCAINE AND MENTHOL 1 LOZENGE: 15; 3.6 LOZENGE ORAL at 20:34

## 2021-03-22 RX ADMIN — IVERMECTIN 19.5 MG: 3 TABLET ORAL at 03:12

## 2021-03-22 RX ADMIN — METOPROLOL TARTRATE 100 MG: 50 TABLET, FILM COATED ORAL at 20:30

## 2021-03-22 RX ADMIN — SODIUM CHLORIDE, PRESERVATIVE FREE 10 ML: 5 INJECTION INTRAVENOUS at 08:17

## 2021-03-22 RX ADMIN — LEVOTHYROXINE SODIUM 50 MCG: 0.05 TABLET ORAL at 05:41

## 2021-03-22 RX ADMIN — SODIUM CHLORIDE, PRESERVATIVE FREE 10 ML: 5 INJECTION INTRAVENOUS at 20:30

## 2021-03-22 RX ADMIN — BENZOCAINE AND MENTHOL 1 LOZENGE: 15; 3.6 LOZENGE ORAL at 13:41

## 2021-03-22 RX ADMIN — BENZONATATE 200 MG: 100 CAPSULE ORAL at 20:34

## 2021-03-22 RX ADMIN — DEXAMETHASONE SODIUM PHOSPHATE 20 MG: 4 INJECTION, SOLUTION INTRA-ARTICULAR; INTRALESIONAL; INTRAMUSCULAR; INTRAVENOUS; SOFT TISSUE at 20:30

## 2021-03-22 RX ADMIN — ROSUVASTATIN CALCIUM 5 MG: 10 TABLET, FILM COATED ORAL at 08:15

## 2021-03-22 ASSESSMENT — PAIN SCALES - GENERAL
PAINLEVEL_OUTOF10: 0

## 2021-03-22 NOTE — PROGRESS NOTES
4 Eyes Skin Assessment     NAME:  Sonya Mcgraw OF BIRTH:  1960  MEDICAL RECORD NUMBER:  [id number]    The patient is being assess for  Admission    I agree that 2 RN's have performed a thorough Head to Toe Skin Assessment on the patient. ALL assessment sites listed below have been assessed. Areas assessed by both nurses:    Head, Face, Ears, Shoulders, Back, Chest, Arms, Elbows, Hands, Sacrum. Buttock, Coccyx, Ischium and Legs. Feet and Heels        Does the Patient have a Wound?  No noted wound(s)       Colby Prevention initiated:  NA   Wound Care Orders initiated:  NA    Pressure Injury (Stage 3,4, Unstageable, DTI, NWPT, and Complex wounds) if present place consult order under [de-identified] NA    New and Established Ostomies if present place consult order under : NA      Nurse 1 eSignature: Electronically signed by Robert Escalante RN on 3/22/21 at 4:22 AM EDT    **SHARE this note so that the co-signing nurse is able to place an eSignature**    Nurse 2 eSignature: Electronically signed by Nell Steven RN on 3/22/21 at 4:27 AM EDT

## 2021-03-22 NOTE — PROGRESS NOTES
Pt on 6L o2 and running around 91%. She is having some dry coughing at this time. Explained to patient care plan and new medicines we will be starting on her. Pulmonary and nephrology also saw her and explained 1.5L fluid restriction to pt. Verbalized understanding of all and  called to get an update on her as well. Bed alarm on and pt instructed to call when she needs to get up to use the bathroom as she is a fall risk with her oxygen and IV.

## 2021-03-22 NOTE — PROGRESS NOTES
Medication Reconciliation     List of medications patient is currently taking is complete. Source of information:   1. Conversation with patient at bedside  2. EPIC records        Notes regarding home medications:  1. Patient received all of her home medications today. 2. History of metformin 500mg fill on 2/23. Patient stated she picked it up but as not started taking it yet.    Midge Snellen, Pharmacy Intern 3/21/2021 10:56 PM

## 2021-03-22 NOTE — ED PROVIDER NOTES
[address] [facility]      Pt Name: Mina Alvarez  MRN: [id number]  [place] 1960  Date of evaluation: 3/21/2021  Provider: KRISTIAN Arenas    This patient was not seen and evaluated by the attending physician No att. providers found. CHIEF COMPLAINT       Chief Complaint   Patient presents with    Cough     covid + 3/12, worsening cough and sob    Shortness of Breath       CRITICAL CARE TIME   I performed a total Critical Care time of 31 minutes, excluding separately reportable procedures. There was a high probability of clinically significant/life threatening deterioration in the patient's condition which required my urgent intervention. Not limited to multiple reexaminations, discussions with attending physician and consultants. HISTORY OF PRESENT ILLNESS  (Location/Symptom, Timing/Onset, Context/Setting, Quality, Duration, Modifying Factors, Severity.)   Mina Alvarez is a 64 y.o. female who presents to the emergency department complaining of shortness of breath and cough. Progressively worsened since the 9th of March. On the 12th of March she was diagnosed with COVID-19. Cough is nonproductive she has had some diarrhea, no vomiting. She is a never smoker with a past medical history of diabetes, hypertension and hypothyroidism. No fevers. Nursing Notes were reviewed and I agree. REVIEW OF SYSTEMS    (2-9 systems for level 4, 10 or more for level 5)     Review of Systems   Constitutional: Positive for fatigue. Negative for fever. Respiratory: Positive for cough and shortness of breath. Cardiovascular: Negative for chest pain. Gastrointestinal: Positive for diarrhea. Negative for abdominal pain and vomiting. Musculoskeletal: Positive for myalgias. Skin: Negative for color change, rash and wound. Neurological: Negative for numbness.    Psychiatric/Behavioral: Negative for agitation, behavioral problems and confusion. Except as noted above the remainder of the review of systems was reviewed and negative. PAST MEDICAL HISTORY         Diagnosis Date    Anxiety     GERD (gastroesophageal reflux disease)     Hypertension     Hypothyroidism     Polyp of colon        SURGICAL HISTORY           Procedure Laterality Date    COLONOSCOPY      COLONOSCOPY  2018    HYSTERECTOMY  2010       CURRENT MEDICATIONS       Previous Medications    ACETAMINOPHEN (TYLENOL) 500 MG TABLET    Take 1 tablet by mouth 4 times daily as needed for Pain    AMITRIPTYLINE (ELAVIL) 25 MG TABLET    TAKE 1 TABLET BY MOUTH TWICE DAILY AS NEEDED FOR SLEEP OR PAIN    AMLODIPINE (NORVASC) 5 MG TABLET    Take 1 tablet by mouth daily    AZELASTINE HCL 0.15 % SOLN    2 sprays by Each Nare route     BENZONATATE (TESSALON) 200 MG CAPSULE    Take 200 mg by mouth 3 times daily as needed for Cough    HYDROCHLOROTHIAZIDE (HYDRODIURIL) 25 MG TABLET    TAKE 1 TABLET BY MOUTH ONCE A DAY    LEVOTHYROXINE (SYNTHROID) 50 MCG TABLET    TAKE 1 TABLET BY MOUTH DAILY    LOSARTAN (COZAAR) 100 MG TABLET    TAKE 1 TABLET BY MOUTH DAILY    METOPROLOL (LOPRESSOR) 100 MG TABLET    Take 1 tablet by mouth 2 times daily    POTASSIUM CHLORIDE (KLOR-CON) 10 MEQ EXTENDED RELEASE TABLET    Take 10 mEq by mouth daily    ROSUVASTATIN (CRESTOR) 5 MG TABLET    Take 5 mg by mouth daily    VENLAFAXINE (EFFEXOR XR) 75 MG EXTENDED RELEASE CAPSULE    TAKE 1 CAPSULE BY MOUTH DAILY       ALLERGIES     Patient has no known allergies. FAMILY HISTORY           Problem Relation Age of Onset    High Blood Pressure Mother     Cancer Mother     Kidney Disease Mother     High Blood Pressure Father     Cancer Father         adrenal     Family Status   Relation Name Status    Mother  (Not Specified)    Father          SOCIAL HISTORY      reports that she has never smoked. She has never used smokeless tobacco. She reports current alcohol use.  She reports that she does not use drugs. PHYSICAL EXAM    (up to 7 for level 4, 8 or more for level 5)     ED Triage Vitals [03/21/21 2219]   BP Temp Temp src Pulse Resp SpO2 Height Weight   123/68 98.2 °F (36.8 °C) -- 85 16 (!) 81 % -- --       Physical Exam  Vitals signs and nursing note reviewed. Constitutional:       Appearance: She is well-developed. HENT:      Head: Normocephalic and atraumatic. Neck:      Musculoskeletal: Normal range of motion. Cardiovascular:      Rate and Rhythm: Normal rate. Pulmonary:      Effort: No respiratory distress. Breath sounds: Wheezing present. Musculoskeletal:      Right lower leg: No edema. Left lower leg: No edema. Neurological:      General: No focal deficit present. Mental Status: She is alert and oriented to person, place, and time. Motor: No weakness. Psychiatric:         Mood and Affect: Mood normal.         Behavior: Behavior normal.         DIAGNOSTIC RESULTS     EKG: All EKG's are interpreted by KRISTIAN Mark in the absence of a cardiologist.    EKG interpreted by myself - please refer to attending physician's note for complete EKG interpretation:  No evidence of acute ischemia or injury. RADIOLOGY:   Non-plain film images such as CT, Ultrasound and MRI are read by the radiologist. Plain radiographic images are visualized and preliminarily interpreted by KRISTIAN Mark with the below findings:    Reviewed radiologist's interpretation. Interpretation per the Radiologist below, if available at the time of this note:    XR CHEST PORTABLE   Final Result   Multifocal bibasilar predominant airspace disease compatible with the   provided history of COVID-19 pneumonia.                LABS:  Labs Reviewed   CBC WITH AUTO DIFFERENTIAL - Abnormal; Notable for the following components:       Result Value    Hematocrit 33.6 (*)     MCHC 36.3 (*)     Lymphocytes Absolute 0.6 (*)     All other components within normal limits    Narrative:     Performed at:  [facility]  [address]   Phone [phone]   COMPREHENSIVE METABOLIC PANEL - Abnormal; Notable for the following components:    Sodium 118 (*)     Potassium 3.2 (*)     Chloride 81 (*)     Glucose 109 (*)     CREATININE 0.5 (*)     Calcium 8.2 (*)     AST 38 (*)     All other components within normal limits    Narrative:     [facility] [employer] tel. [phone],  Chemistry results called to and read back by Colleen Troy RN, 03/22/2021  00:05, by [facility]  Performed at:  [facility]  [address]   Phone [phone]   CULTURE, BLOOD 2   CULTURE, BLOOD 1   TROPONIN    Narrative:     [address]formed at:  [address]   Phone [phone]   LACTIC ACID, PLASMA    Narrative:     Performed at:  [address] [email or web address] [address]   Phone [phone]   OSMOLALITY, URINE   SODIUM, URINE, RANDOM   URINE RT REFLEX TO CULTURE   TYPE AND SCREEN       All other labs were within normal range or not returned as of this dictation. EMERGENCY DEPARTMENT COURSE and DIFFERENTIAL DIAGNOSIS/MDM:   Vitals:    Vitals:    03/21/21 2219 03/21/21 2342   BP: 123/68    Pulse: 85    Resp: 16    Temp: 98.2 °F (36.8 °C)    SpO2: (!) 81% 91%     Patient presents initially saturating 81% on room air. She has wheezes diffusely with cough that is nonproductive and a positive COVID-19 test approximately 9 days ago. No prior history of underlying lung disease or oxygen requirement. She is saturating 91 to 93% on 4 L via nasal cannula. Given steroids and 500 cc ns fluids in the ER. She was found to be hyponatremic at 118 and potassium of 3.2.   Given oral potassium and I contacted nephrology Dr. Anjali Stanley who advised every 4 to 6-hour BMPs increasing the sodium level by no more than 8 in the first 24 hours. He recommended normal saline run slowly. This was communicated to the hospitalist and they were consulted for admission, she is agreeable. CONSULTS:  IP CONSULT TO HOSPITALIST  IP CONSULT TO NEPHROLOGY    PROCEDURES:  Procedures      FINAL IMPRESSION      1. Acute respiratory failure due to COVID-19 ([facility] [address]    2. Hypoxia    3. Hypokalemia    4. Hyponatremia          DISPOSITION/PLAN   DISPOSITION        PATIENT REFERRED TO:  No follow-up provider specified.     DISCHARGE MEDICATIONS:  New Prescriptions    No medications on file       (Please note that portions of this note were completed with a voice recognition program.  Efforts were made to edit the dictations but occasionally words are mis-transcribed.)    Paola Rice, [address], [place]  03/22/21 [address], [place]  03/22/21 0025

## 2021-03-22 NOTE — H&P
Hospital Medicine History & Physical      PCP: Mel Husain MD    Date of Admission: 3/21/2021    Date of Service: Pt seen/examined on 3/22/2021 and Admitted to Inpatient with expected LOS greater than two midnights due to medical therapy. Chief Complaint:  Shortness of breath      History Of Present Illness:      64 y.o. female with PMHx of HTN, HLD and hypothyroidism presented to [facility] with cough and shortness of breath. Patient was diagnosed with Covid on 3/12/2021. She states her symptoms began on 3/9 initially with loss of taste and decreased appetite. She then developed cough with shortness of breath. Shortness of breath is worsened with any exertion. She complains of fatigue and myalgias. On arrival to the emergency department she was hypoxic with room air saturation of 81%. She has had no treatment other than cough medicine as needed. Past Medical History:          Diagnosis Date    Anxiety     GERD (gastroesophageal reflux disease)     Hypertension     Hypothyroidism     Polyp of colon        Past Surgical History:          Procedure Laterality Date    COLONOSCOPY      COLONOSCOPY  07/13/2018    HYSTERECTOMY  2010       Medications Prior to Admission:      Prior to Admission medications    Medication Sig Start Date End Date Taking?  Authorizing Provider   potassium chloride (KLOR-CON) 10 MEQ extended release tablet Take 10 mEq by mouth daily   Yes Historical Provider, MD   rosuvastatin (CRESTOR) 5 MG tablet Take 5 mg by mouth daily   Yes Historical Provider, MD   benzonatate (TESSALON) 200 MG capsule Take 200 mg by mouth 3 times daily as needed for Cough   Yes Historical Provider, MD   amitriptyline (ELAVIL) 25 MG tablet TAKE 1 TABLET BY MOUTH TWICE DAILY AS NEEDED FOR SLEEP OR PAIN 7/14/20  Yes Marcial Reid MD   losartan (COZAAR) 100 MG tablet TAKE 1 TABLET BY MOUTH DAILY 4/30/20  Yes Marcial Reid MD   venlafaxine (EFFEXOR XR) 75 MG extended release capsule TAKE 1 CAPSULE BY MOUTH DAILY  Patient taking differently: Take 37.5 mg by mouth daily  3/30/20  Yes Benjamin Escobar MD   levothyroxine (SYNTHROID) 50 MCG tablet TAKE 1 TABLET BY MOUTH DAILY 11/1/19  Yes Benjamin Escobar MD   hydrochlorothiazide (HYDRODIURIL) 25 MG tablet TAKE 1 TABLET BY MOUTH ONCE A DAY 10/29/19  Yes THIEN Hermosillo CNP   metoprolol (LOPRESSOR) 100 MG tablet Take 1 tablet by mouth 2 times daily 9/9/19  Yes Benjamin Escobar MD   amLODIPine (NORVASC) 5 MG tablet Take 1 tablet by mouth daily  Patient taking differently: Take 10 mg by mouth daily  9/9/19  Yes Benjamin Escobar MD   acetaminophen (TYLENOL) 500 MG tablet Take 1 tablet by mouth 4 times daily as needed for Pain 11/21/18   Demetri Burdick MD   azelastine HCl 0.15 % SOLN 2 sprays by Each Nare route  9/22/16   Historical Provider, MD       Allergies:  Patient has no known allergies. Social History:      The patient currently lives at home with . TOBACCO:   reports that she has never smoked. She has never used smokeless tobacco.  ETOH:   reports current alcohol use. Family History:      Reviewed in detail positive as follows:        Problem Relation Age of Onset    High Blood Pressure Mother     Cancer Mother     Kidney Disease Mother     High Blood Pressure Father     Cancer Father         adrenal       REVIEW OF SYSTEMS:   Pertinent positives as noted in the HPI. All other systems reviewed and negative. PHYSICAL EXAM PERFORMED:    /73   Pulse 74   Temp 99 °F (37.2 °C) (Oral)   Resp 17   Ht 5' 4\" (1.626 m)   Wt 207 lb 0.2 oz (93.9 kg)   SpO2 92%   BMI 35.53 kg/m²     General appearance: Ill-appearing  female lying on hospital bed uncomfortable in appearance but in no acute distress, appears stated age and cooperative. HEENT:  Normal cephalic, atraumatic without obvious deformity. Pupils equal, round, and reactive to light. Extra ocular muscles intact.  Conjunctivae/corneas clear.  Neck: Supple, with full range of motion. No jugular venous distention. Trachea midline. Respiratory: Increased respiratory effort. Bibasilar Rales, no accessory muscle use   Cardiovascular:  Regular rate and rhythm without murmurs, rubs or gallops. Abdomen: Soft, non-tender, non-distended, without rebound or guarding. Normal bowel sounds. Musculoskeletal:  No clubbing, cyanosis or edema bilaterally. Full range of motion without deformity. Skin: Skin color, texture, turgor normal.  No rashes or lesions. Neurologic:  Neurovascularly intact without any focal sensory/motor deficits. Cranial nerves: II-XII intact, grossly non-focal.  Psychiatric:  Alert and oriented, thought content appropriate, normal insight  Capillary Refill: Brisk,< 3 seconds   Peripheral Pulses: +2 palpable, equal bilaterally       Labs:     Recent Labs     03/21/21 2313   WBC 5.8   HGB 12.2   HCT 33.6*        Recent Labs     03/21/21 2313   *   K 3.2*   CL 81*   CO2 25   BUN 9   CREATININE 0.5*   CALCIUM 8.2*     Recent Labs     03/21/21 2313   AST 38*   ALT 22   BILITOT 0.6   ALKPHOS 68     No results for input(s): INR in the last 72 hours. Recent Labs     03/21/21 2313   TROPONINI <0.01       Urinalysis:      Lab Results   Component Value Date    NITRU Negative 02/18/2013    WBCUA 0-3 02/18/2013    BACTERIA 3+ 02/18/2013    BLOODU Negative 02/18/2013    SPECGRAV 1.015 02/18/2013    GLUCOSEU Negative 02/18/2013       Radiology:       XR CHEST PORTABLE   Final Result   Multifocal bibasilar predominant airspace disease compatible with the   provided history of COVID-19 pneumonia. ASSESSMENT:    Active Hospital Problems    Diagnosis Date Noted    Pneumonia due to COVID-19 virus [U07.1, J12.82] 03/22/2021    Acute respiratory failure with hypoxia (HCC) [J96.01] 03/22/2021    Obesity (BMI 30-39. 9) [E66.9] 10/29/2016    Acquired hypothyroidism [E03.9] 10/29/2016    Essential hypertension [I10] 10/29/2016 PLAN:      Covid 19 Pneumonia  - droplet plus isolation precautions  - Covid 19 swab + 3/12/2021 (Symptom onset 3/9/2021)  - Oxygen titrate to keep saturation > 90%  - Chest xray: Multifocal bibasilar predominant airspace disease compatible with COVID-19 pneumonia  - Decadron 10 mg X 10 days  - trend inflammatory markers  - consult Pulmonology    Acute hypoxic respiratory failure   - due to Covid Pnuemonia  - with increased work of breath, tachypnea and hypoxia  - room air saturation 81%  - provide supplemental oxygen as necessary to keep SaO2 92% or greater. Hyponatremia  - na 118  - saline 50 ml/hr  - recheck bmp q 4 hours  - nephrology consulted in ED    Essential (primary) hypertension   - monitor blood pressure  - continue home meds     Hyperlipidemia   - continue statin    Hypothyroidism  - check tsh  - continue synthroid    DVT Prophylaxis: Lovenox  Diet: DIET GENERAL;  Code Status: Full Code    Dispo - Inpatient       [address], APRN - CNP    Thank you Reagan Chirinos MD for the opportunity to be involved in this patient's care.  If you have any questions or concerns please feel free to contact me at [phone].'

## 2021-03-22 NOTE — ACP (ADVANCE CARE PLANNING)
Advance Care Planning     Advance Care Planning Activator (Inpatient)  Conversation Note      Date of ACP Conversation: 3/21/2021    Conversation Conducted with: Patient with Decision Making Capacity    ACP Activator: [address] Decision Maker:     Current Designated Health Care Decision Maker:     Primary Decision Maker: [place] Peggy Spouse - [phone]    Care Preferences    Ventilation: \"If you were in your present state of health and suddenly became very ill and were unable to breathe on your own, what would your preference be about the use of a ventilator (breathing machine) if it were available to you? \"      Would the patient desire the use of ventilator (breathing machine)?: yes    \"If your health worsens and it becomes clear that your chance of recovery is unlikely, what would your preference be about the use of a ventilator (breathing machine) if it were available to you? \"     Would the patient desire the use of ventilator (breathing machine)?: Unsure      Resuscitation  \"CPR works best to restart the heart when there is a sudden event, like a heart attack, in someone who is otherwise healthy. Unfortunately, CPR does not typically restart the heart for people who have serious health conditions or who are very sick. \"    \"In the event your heart stopped as a result of an underlying serious health condition, would you want attempts to be made to restart your heart (answer \"yes\" for attempt to resuscitate) or would you prefer a natural death (answer \"no\" for do not attempt to resuscitate)? \" yes       [] Yes   [x] No   Educated Patient / Humberto Maldonado regarding differences between Advance Directives and portable DNR orders.     Length of ACP Conversation in minutes:  5 minutes  Conversation Outcomes:  [x] ACP discussion completed  [] Existing advance directive reviewed with patient; no changes to patient's previously recorded wishes  [] New Advance Directive completed  [] Portable Do Not Rescitate prepared for Provider review and signature  [] POLST/POST/MOLST/MOST prepared for Provider review and signature      Follow-up plan:    [] Schedule follow-up conversation to continue planning  [] Referred individual to Provider for additional questions/concerns   [] Advised patient/agent/surrogate to review completed ACP document and update if needed with changes in condition, patient preferences or care setting    [x] This note routed to one or more involved healthcare providers  Electronically signed by Jovanna Wells RN Case Management [phone] on 3/22/2021 at 1:57 PM

## 2021-03-22 NOTE — CONSULTS
[facility] Inpatient Nephrology Note        SUBJECTIVE:    Reason: Hyponatremir    HPI: Pt is a 64year old M with who I have been asked to see for hyponatremia. He presents to the ER with cough and SOB in association with loss of taste and smell. He is noted to have low O2 sats. He has been placed on O2. He is COVID +. His serum sodium is 118. Baseline labs indicate serum sodium of 137 in 2/2020. She denies nausea. Has had loose stool. She is on chronic HCTZ, but no change in dose.      Past Medical History:   Diagnosis Date    Anxiety     GERD (gastroesophageal reflux disease)     Hypertension     Hypothyroidism     Polyp of colon        Family History   Problem Relation Age of Onset    High Blood Pressure Mother     Cancer Mother     Kidney Disease Mother     High Blood Pressure Father     Cancer Father         adrenal       Social History     Socioeconomic History    Marital status:      Spouse name: Not on file    Number of children: Not on file    Years of education: Not on file    Highest education level: Not on file   Occupational History    Not on file   Social Needs    Financial resource strain: Not on file    Food insecurity     Worry: Not on file     Inability: Not on file    Transportation needs     Medical: Not on file     Non-medical: Not on file   Tobacco Use    Smoking status: Never Smoker    Smokeless tobacco: Never Used   Substance and Sexual Activity    Alcohol use: Yes     Comment: soc    Drug use: No    Sexual activity: Yes     Partners: Male   Lifestyle    Physical activity     Days per week: Not on file     Minutes per session: Not on file    Stress: Not on file   Relationships    Social connections     Talks on phone: Not on file     Gets together: Not on file     Attends Evangelical service: Not on file     Active member of club or organization: Not on file     Attends meetings of clubs or organizations: Not on file Relationship status: Not on file    Intimate partner violence     Fear of current or ex partner: Not on file     Emotionally abused: Not on file     Physically abused: Not on file     Forced sexual activity: Not on file   Other Topics Concern    Not on file   Social History Narrative    Not on file       ROS: no confusion, focal weakness, chest pain, edema, abdominal pain or rash. Rest of 10 point ROS otherwise unremarkable. Medications     amLODIPine  10 mg Oral Daily    levothyroxine  50 mcg Oral Daily    losartan  100 mg Oral Daily    metoprolol  100 mg Oral BID    potassium chloride  10 mEq Oral Daily    rosuvastatin  5 mg Oral Daily    venlafaxine  37.5 mg Oral Daily    sodium chloride flush  10 mL Intravenous 2 times per day    enoxaparin  40 mg Subcutaneous BID    dexamethasone  20 mg Intravenous Q24H    remdesivir IVPB  200 mg Intravenous Once    Followed by   Gretchen Mandel ON 3/23/2021] remdesivir IVPB  100 mg Intravenous Q24H         OBJECTIVE      Physical    TEMPERATURE:  Current - Temp: 97.5 °F (36.4 °C); Max - Temp  Av.2 °F (36.8 °C)  Min: 97.5 °F (36.4 °C)  Max: 99 °F (37.2 °C)  RESPIRATIONS RANGE: Resp  Av  Min: 16  Max: 24  PULSE RANGE: Pulse  Av.4  Min: 62  Max: 85  BLOOD PRESSURE RANGE:  Systolic (92RAC), XKM:499 , Min:106 , PJA:810   ; Diastolic (70RUU), JXJ:42, Min:61, Max:73    PULSE OXIMETRY RANGE: SpO2  Av.8 %  Min: 81 %  Max: 93 %  24HR INTAKE/OUTPUT:  No intake or output data in the 24 hours ending 21 0820    GEN: no distress. obese  HEENT: no icterus  NECK: Trachea midline  CV: RRR  LUNGS: rales bilaterally, unlabored  ABD: + bowel sounds. No distension. No  tenderness  EXT: no edema.    SKIN: no rash  NEURO: no tremor    Data      Lab Results   Component Value Date    CREATININE <0.5 (L) 2021    BUN 10 2021     (L) 2021    K 3.8 2021    CL 83 (L) 2021    CO2 23 2021     Lab Results   Component Value Date WBC 2.7 (L) 03/22/2021    HGB 11.9 (L) 03/22/2021    HCT 33.5 (L) 03/22/2021    MCV 80.8 03/22/2021     03/22/2021       ASSESSMENT     Patient Active Problem List   Diagnosis    Obesity (BMI 30-39. 9)    Hypertriglyceridemia    Lipoma of torso    Hot flashes    Hyperglycemia    Acquired hypothyroidism    Essential hypertension    Allergic rhinitis    Status post hysterectomy    headache    Lipoma of right lower extremity    Gastroesophageal reflux disease without esophagitis    Pneumonia due to COVID-19 virus    Acute respiratory failure with hypoxia (HCC)       ASSESSMENT/PLAN:    # Hyponatremia  - new onset  - without symptoms currently  - baseline serum sodium 137 in 2/2020  - suspect SIADH related to pulmonary process  - check Uosm, serum osm, Uric acic, Urine sodium, urine potassium  - check TSH and cortisol  - add 1.5L fluid restriction for now  - hold HCTZ  - follow up John+UK/Plasma Na ratio to determine if fluid restriction alone will be successful.  - check sodium q 12  - call if serum sodium > 128     # COVID Pneumonia  - on O2  - pulmonary consulted    # HTN  - monitor BP off HCTZ      Available via [employer] secure messaging

## 2021-03-22 NOTE — PLAN OF CARE
Problem: Airway Clearance - Ineffective  Goal: Achieve or maintain patent airway  Outcome: Ongoing     Problem: Gas Exchange - Impaired  Goal: Absence of hypoxia  Outcome: Ongoing  Goal: Promote optimal lung function  Outcome: Ongoing     Problem: Breathing Pattern - Ineffective  Goal: Ability to achieve and maintain a regular respiratory rate  Outcome: Ongoing     Problem:  Body Temperature -  Risk of, Imbalanced  Goal: Ability to maintain a body temperature within defined limits  Outcome: Ongoing  Goal: Will regain or maintain usual level of consciousness  Outcome: Ongoing  Goal: Complications related to the disease process, condition or treatment will be avoided or minimized  Outcome: Ongoing     Problem: Isolation Precautions - Risk of Spread of Infection  Goal: Prevent transmission of infection  Outcome: Ongoing     Problem: Nutrition Deficits  Goal: Optimize nutritional status  Outcome: Ongoing     Problem: Loneliness or Risk for Loneliness  Goal: Demonstrate positive use of time alone when socialization is not possible  Outcome: Ongoing     Problem: Fatigue  Goal: Verbalize increase energy and improved vitality  Outcome: Ongoing     Problem: Patient Education: Go to Patient Education Activity  Goal: Patient/Family Education  Outcome: Ongoing

## 2021-03-22 NOTE — CONSULTS
PATIENT IS SEEN AT THE REQUEST OF BETSY Pacheco for COVID pneumonia, hypoxia     CONSULTING PHYSICIAN: SELWYN Cade     HISTORY OF PRESENT ILLNESS:  This is a 64 y.o. female who presented to the ED on 3/21 with a CC of COVID and SOB. Per ED notes she tested positive for COVID on 3/12 and has seen progressively worsening SOB and cough. She was hypoxic and therefore was admitted. She is doing better overall. Has been sick for about 10 days. Feels like she got sick from her . She is coughing more than anything else       Established Pulmonologist:  None    PAST MEDICAL HISTORY:  Past Medical History:   Diagnosis Date    Anxiety     GERD (gastroesophageal reflux disease)     Hypertension     Hypothyroidism     Polyp of colon        PAST SURGICAL HISTORY:  Past Surgical History:   Procedure Laterality Date    COLONOSCOPY      COLONOSCOPY  07/13/2018    HYSTERECTOMY  2010       FAMILY HISTORY:  family history includes Cancer in her father and mother; High Blood Pressure in her father and mother; Kidney Disease in her mother. Mother had Multiple myeloma  Father had renal cell cancer    SOCIAL HISTORY:   reports that she has never smoked. She has never used smokeless tobacco.  Does not work.   Used to work in office setting    Scheduled Meds:   amLODIPine  10 mg Oral Daily    levothyroxine  50 mcg Oral Daily    losartan  100 mg Oral Daily    metoprolol  100 mg Oral BID    potassium chloride  10 mEq Oral Daily    rosuvastatin  5 mg Oral Daily    venlafaxine  37.5 mg Oral Daily    sodium chloride flush  10 mL Intravenous 2 times per day    enoxaparin  40 mg Subcutaneous BID    dexamethasone  10 mg Intravenous Q24H       Continuous Infusions:   sodium chloride 50 mL/hr at 03/22/21 0311       PRN Meds:  azelastine, benzonatate, sodium chloride flush, promethazine **OR** ondansetron, polyethylene glycol, acetaminophen **OR** acetaminophen, guaiFENesin-dextromethorphan, benzocaine-menthol, albuterol sulfate HFA    ALLERGIES:  Patient has No Known Allergies. REVIEW OF SYSTEMS:  Constitutional: Has been feeling ill for about 1 week  HENT: Negative for sore throat  Eyes: Negative for redness   Respiratory: Coughing, SOB  Cardiovascular: Negative for chest pain  Gastrointestinal: Some GI upset   Genitourinary: Negative for hematuria, negative for dysuria  Musculoskeletal: Negative for arthralgias   Skin: Negative for rash  Neurological: Negative for syncope  Hematological: Negative for adenopathy  Extremities:  Negative for swelling    PHYSICAL EXAM:  Blood pressure 113/67, pulse 68, temperature 97.9 °F (36.6 °C), resp. rate 18, height 5' 4\" (1.626 m), weight 207 lb 0.2 oz (93.9 kg), SpO2 91 %, not currently breastfeeding.'  Gen: No distress. Eyes: PERRL. No sclera icterus. No conjunctival injection. ENT: No discharge. Pharynx clear. Neck: Trachea midline. No obvious mass. Resp: Diffuse crackles   CV: Regular rate. Regular rhythm. No murmur or rub. GI: Non-tender. Non-distended. No hernia. BS present. Skin: Warm and dry. No nodule on exposed extremities. Lymph: No cervical LAD. No supraclavicular LAD. M/S: No cyanosis. No joint deformity. Neuro: Awake. Alert. Moves all four extremities. EXT:   no edema, no clubbing    LABS:  CBC:   Recent Labs     03/21/21 2313 03/22/21  0539   WBC 5.8 2.7*   HGB 12.2 11.9*   HCT 33.6* 33.5*   MCV 80.6 80.8    250     BMP:   Recent Labs     03/21/21 2313 03/22/21  0539   * 120*   K 3.2* 3.8   CL 81* 83*   CO2 25 23   BUN 9 10   CREATININE 0.5* <0.5*     LIVER PROFILE:   Recent Labs     03/21/21 2313 03/22/21  0539   AST 38* 32   ALT 22 22   BILITOT 0.6 0.6   ALKPHOS 68 75     PT/INR: No results for input(s): PROTIME, INR in the last 72 hours. APTT: No results for input(s): APTT in the last 72 hours.   UA:  Recent Labs     03/22/21  0615   COLORU YELLOW   PHUR 7.0   WBCUA 8*   RBCUA 2   BACTERIA 1+*   CLARITYU CLOUDY*   SPECGRAV 1.008

## 2021-03-22 NOTE — PROGRESS NOTES
Patient arrived to room 5255 from ED. Transported by Leslie Lemos RN. Pt ambulated to the bed independently. Telemetry applied to patient and verified with [facility]. Box #[id number]. Vital signs taken, view flow sheets. Patient alert and oriented. Patient oriented to room and use of call light. Patient educated to call before getting up. Call light and personal belongings in reach. Bed alarm on. Fall contract reviewed with patient and signed. Plan of care reviewed with patient. Patient denied any further needs and questions at this time. Will continue to monitor.      Electronically signed by Royal Meliza RN on 3/22/2021 at 4:21 AM

## 2021-03-23 LAB
ALBUMIN SERPL-MCNC: 3.3 G/DL (ref 3.4–5)
ALBUMIN SERPL-MCNC: 3.3 G/DL (ref 3.4–5)
ALP BLD-CCNC: 69 U/L (ref 40–129)
ALT SERPL-CCNC: 19 U/L (ref 10–40)
ANION GAP SERPL CALCULATED.3IONS-SCNC: 10 MMOL/L (ref 3–16)
AST SERPL-CCNC: 21 U/L (ref 15–37)
BASOPHILS ABSOLUTE: 0 K/UL (ref 0–0.2)
BASOPHILS RELATIVE PERCENT: 0.2 %
BILIRUB SERPL-MCNC: 0.4 MG/DL (ref 0–1)
BILIRUBIN DIRECT: <0.2 MG/DL (ref 0–0.3)
BILIRUBIN, INDIRECT: ABNORMAL MG/DL (ref 0–1)
BUN BLDV-MCNC: 11 MG/DL (ref 7–20)
CALCIUM SERPL-MCNC: 8.9 MG/DL (ref 8.3–10.6)
CHLORIDE BLD-SCNC: 92 MMOL/L (ref 99–110)
CO2: 23 MMOL/L (ref 21–32)
CORTISOL - AM: 4.5 UG/DL (ref 4.3–22.4)
CREAT SERPL-MCNC: <0.5 MG/DL (ref 0.6–1.1)
EOSINOPHILS ABSOLUTE: 0 K/UL (ref 0–0.6)
EOSINOPHILS RELATIVE PERCENT: 0 %
GFR AFRICAN AMERICAN: >60
GFR NON-AFRICAN AMERICAN: >60
GLUCOSE BLD-MCNC: 217 MG/DL (ref 70–99)
HCT VFR BLD CALC: 30.6 % (ref 36–48)
HEMOGLOBIN: 11 G/DL (ref 12–16)
L. PNEUMOPHILA SEROGP 1 UR AG: NORMAL
LYMPHOCYTES ABSOLUTE: 0.5 K/UL (ref 1–5.1)
LYMPHOCYTES RELATIVE PERCENT: 6.6 %
MCH RBC QN AUTO: 29 PG (ref 26–34)
MCHC RBC AUTO-ENTMCNC: 36.1 G/DL (ref 31–36)
MCV RBC AUTO: 80.3 FL (ref 80–100)
MONOCYTES ABSOLUTE: 0.4 K/UL (ref 0–1.3)
MONOCYTES RELATIVE PERCENT: 5.6 %
NEUTROPHILS ABSOLUTE: 6.7 K/UL (ref 1.7–7.7)
NEUTROPHILS RELATIVE PERCENT: 87.6 %
OSMOLALITY: 268 MOSM/KG (ref 275–295)
PDW BLD-RTO: 13 % (ref 12.4–15.4)
PHOSPHORUS: 2.6 MG/DL (ref 2.5–4.9)
PLATELET # BLD: 295 K/UL (ref 135–450)
PMV BLD AUTO: 7.3 FL (ref 5–10.5)
POTASSIUM SERPL-SCNC: 3.8 MMOL/L (ref 3.5–5.1)
PROCALCITONIN: 0.05 NG/ML (ref 0–0.15)
RBC # BLD: 3.82 M/UL (ref 4–5.2)
SODIUM BLD-SCNC: 125 MMOL/L (ref 136–145)
TOTAL PROTEIN: 6.5 G/DL (ref 6.4–8.2)
TSH SERPL DL<=0.05 MIU/L-ACNC: 0.13 UIU/ML (ref 0.27–4.2)
URIC ACID, SERUM: 3.2 MG/DL (ref 2.6–6)
WBC # BLD: 7.6 K/UL (ref 4–11)

## 2021-03-23 PROCEDURE — 2700000000 HC OXYGEN THERAPY PER DAY

## 2021-03-23 PROCEDURE — 99233 SBSQ HOSP IP/OBS HIGH 50: CPT | Performed by: INTERNAL MEDICINE

## 2021-03-23 PROCEDURE — 84443 ASSAY THYROID STIM HORMONE: CPT

## 2021-03-23 PROCEDURE — 6360000002 HC RX W HCPCS: Performed by: INTERNAL MEDICINE

## 2021-03-23 PROCEDURE — 85025 COMPLETE CBC W/AUTO DIFF WBC: CPT

## 2021-03-23 PROCEDURE — 80069 RENAL FUNCTION PANEL: CPT

## 2021-03-23 PROCEDURE — 2500000003 HC RX 250 WO HCPCS: Performed by: INTERNAL MEDICINE

## 2021-03-23 PROCEDURE — 6360000002 HC RX W HCPCS: Performed by: NURSE PRACTITIONER

## 2021-03-23 PROCEDURE — 2580000003 HC RX 258: Performed by: INTERNAL MEDICINE

## 2021-03-23 PROCEDURE — 6370000000 HC RX 637 (ALT 250 FOR IP): Performed by: NURSE PRACTITIONER

## 2021-03-23 PROCEDURE — 80076 HEPATIC FUNCTION PANEL: CPT

## 2021-03-23 PROCEDURE — 84145 PROCALCITONIN (PCT): CPT

## 2021-03-23 PROCEDURE — 83930 ASSAY OF BLOOD OSMOLALITY: CPT

## 2021-03-23 PROCEDURE — 2060000000 HC ICU INTERMEDIATE R&B

## 2021-03-23 PROCEDURE — 2580000003 HC RX 258: Performed by: NURSE PRACTITIONER

## 2021-03-23 PROCEDURE — 82533 TOTAL CORTISOL: CPT

## 2021-03-23 PROCEDURE — 6370000000 HC RX 637 (ALT 250 FOR IP): Performed by: INTERNAL MEDICINE

## 2021-03-23 PROCEDURE — 94761 N-INVAS EAR/PLS OXIMETRY MLT: CPT

## 2021-03-23 PROCEDURE — 36415 COLL VENOUS BLD VENIPUNCTURE: CPT

## 2021-03-23 PROCEDURE — 84550 ASSAY OF BLOOD/URIC ACID: CPT

## 2021-03-23 RX ORDER — POTASSIUM CHLORIDE 20 MEQ/1
20 TABLET, EXTENDED RELEASE ORAL 2 TIMES DAILY WITH MEALS
Status: DISCONTINUED | OUTPATIENT
Start: 2021-03-23 | End: 2021-03-28 | Stop reason: HOSPADM

## 2021-03-23 RX ORDER — LOSARTAN POTASSIUM 50 MG/1
50 TABLET ORAL DAILY
Status: DISCONTINUED | OUTPATIENT
Start: 2021-03-24 | End: 2021-03-28 | Stop reason: HOSPADM

## 2021-03-23 RX ORDER — POTASSIUM CHLORIDE 750 MG/1
10 TABLET, FILM COATED, EXTENDED RELEASE ORAL ONCE
Status: COMPLETED | OUTPATIENT
Start: 2021-03-23 | End: 2021-03-23

## 2021-03-23 RX ORDER — AMITRIPTYLINE HYDROCHLORIDE 25 MG/1
25 TABLET, FILM COATED ORAL NIGHTLY
Status: DISCONTINUED | OUTPATIENT
Start: 2021-03-23 | End: 2021-03-28 | Stop reason: HOSPADM

## 2021-03-23 RX ADMIN — GUAIFENESIN SYRUP AND DEXTROMETHORPHAN 5 ML: 100; 10 SYRUP ORAL at 05:06

## 2021-03-23 RX ADMIN — BENZOCAINE AND MENTHOL 1 LOZENGE: 15; 3.6 LOZENGE ORAL at 20:37

## 2021-03-23 RX ADMIN — BENZOCAINE AND MENTHOL 1 LOZENGE: 15; 3.6 LOZENGE ORAL at 18:04

## 2021-03-23 RX ADMIN — POTASSIUM CHLORIDE 10 MEQ: 750 TABLET, FILM COATED, EXTENDED RELEASE ORAL at 08:50

## 2021-03-23 RX ADMIN — ROSUVASTATIN CALCIUM 5 MG: 10 TABLET, FILM COATED ORAL at 08:50

## 2021-03-23 RX ADMIN — POTASSIUM CHLORIDE 10 MEQ: 750 TABLET, FILM COATED, EXTENDED RELEASE ORAL at 11:59

## 2021-03-23 RX ADMIN — SODIUM CHLORIDE, PRESERVATIVE FREE 10 ML: 5 INJECTION INTRAVENOUS at 20:29

## 2021-03-23 RX ADMIN — DEXAMETHASONE SODIUM PHOSPHATE 20 MG: 4 INJECTION, SOLUTION INTRA-ARTICULAR; INTRALESIONAL; INTRAMUSCULAR; INTRAVENOUS; SOFT TISSUE at 20:29

## 2021-03-23 RX ADMIN — METOPROLOL TARTRATE 100 MG: 50 TABLET, FILM COATED ORAL at 20:29

## 2021-03-23 RX ADMIN — AMITRIPTYLINE HYDROCHLORIDE 25 MG: 25 TABLET, FILM COATED ORAL at 20:29

## 2021-03-23 RX ADMIN — AMLODIPINE BESYLATE 10 MG: 10 TABLET ORAL at 08:50

## 2021-03-23 RX ADMIN — ENOXAPARIN SODIUM 40 MG: 40 INJECTION SUBCUTANEOUS at 20:30

## 2021-03-23 RX ADMIN — BENZOCAINE AND MENTHOL 1 LOZENGE: 15; 3.6 LOZENGE ORAL at 01:58

## 2021-03-23 RX ADMIN — BENZONATATE 200 MG: 100 CAPSULE ORAL at 08:50

## 2021-03-23 RX ADMIN — VENLAFAXINE HYDROCHLORIDE 37.5 MG: 37.5 CAPSULE, EXTENDED RELEASE ORAL at 08:50

## 2021-03-23 RX ADMIN — SODIUM CHLORIDE: 9 INJECTION, SOLUTION INTRAVENOUS at 03:30

## 2021-03-23 RX ADMIN — ENOXAPARIN SODIUM 40 MG: 40 INJECTION SUBCUTANEOUS at 08:50

## 2021-03-23 RX ADMIN — LOSARTAN POTASSIUM 100 MG: 100 TABLET, FILM COATED ORAL at 08:50

## 2021-03-23 RX ADMIN — POTASSIUM CHLORIDE 20 MEQ: 1500 TABLET, EXTENDED RELEASE ORAL at 18:35

## 2021-03-23 RX ADMIN — GUAIFENESIN SYRUP AND DEXTROMETHORPHAN 5 ML: 100; 10 SYRUP ORAL at 22:17

## 2021-03-23 RX ADMIN — SODIUM CHLORIDE, PRESERVATIVE FREE 10 ML: 5 INJECTION INTRAVENOUS at 08:50

## 2021-03-23 RX ADMIN — GUAIFENESIN SYRUP AND DEXTROMETHORPHAN 5 ML: 100; 10 SYRUP ORAL at 18:04

## 2021-03-23 RX ADMIN — LEVOTHYROXINE SODIUM 50 MCG: 0.05 TABLET ORAL at 05:05

## 2021-03-23 RX ADMIN — BENZOCAINE AND MENTHOL 1 LOZENGE: 15; 3.6 LOZENGE ORAL at 08:50

## 2021-03-23 RX ADMIN — REMDESIVIR 100 MG: 100 INJECTION, POWDER, LYOPHILIZED, FOR SOLUTION INTRAVENOUS at 11:59

## 2021-03-23 RX ADMIN — METOPROLOL TARTRATE 100 MG: 50 TABLET, FILM COATED ORAL at 08:50

## 2021-03-23 ASSESSMENT — PAIN SCALES - GENERAL: PAINLEVEL_OUTOF10: 0

## 2021-03-23 NOTE — PLAN OF CARE
Problem: Airway Clearance - Ineffective  Goal: Achieve or maintain patent airway  Outcome: Ongoing     Problem: Gas Exchange - Impaired  Goal: Absence of hypoxia  Outcome: Ongoing     Problem: Breathing Pattern - Ineffective  Goal: Ability to achieve and maintain a regular respiratory rate  Outcome: Ongoing     Problem: Body Temperature -  Risk of, Imbalanced  Goal: Will regain or maintain usual level of consciousness  Outcome: Ongoing     Problem:  Body Temperature -  Risk of, Imbalanced  Goal: Complications related to the disease process, condition or treatment will be avoided or minimized  Outcome: Ongoing     Problem: Risk for Fluid Volume Deficit  Goal: Maintain normal heart rhythm  Outcome: Ongoing     Problem: Risk for Fluid Volume Deficit  Goal: Maintain absence of muscle cramping  Outcome: Ongoing

## 2021-03-23 NOTE — PROGRESS NOTES
[facility] Inpatient Nephrology Note        SUBJECTIVE:    Reason: Hyponatremia  HPI: serum sodium slowly improving. No nausea. Soc Hx: no family present  ROS: no diarrhea. BP adequate. Medications     amitriptyline  25 mg Oral Nightly    amLODIPine  10 mg Oral Daily    levothyroxine  50 mcg Oral Daily    losartan  100 mg Oral Daily    metoprolol  100 mg Oral BID    potassium chloride  10 mEq Oral Daily    rosuvastatin  5 mg Oral Daily    venlafaxine  37.5 mg Oral Daily    sodium chloride flush  10 mL Intravenous 2 times per day    enoxaparin  40 mg Subcutaneous BID    dexamethasone  20 mg Intravenous Q24H    remdesivir IVPB  100 mg Intravenous Q24H         OBJECTIVE      Physical    TEMPERATURE:  Current - Temp: 97.3 °F (36.3 °C); Max - Temp  Av.7 °F (36.5 °C)  Min: 97.2 °F (36.2 °C)  Max: 98.1 °F (36.7 °C)  RESPIRATIONS RANGE: Resp  Av.8  Min: 18  Max: 20  PULSE RANGE: Pulse  Av.3  Min: 58  Max: 71  BLOOD PRESSURE RANGE:  Systolic (68NRG), RGP:362 , Min:106 , GPT:931   ; Diastolic (99SKN), KWK:07, Min:66, Max:75    PULSE OXIMETRY RANGE: SpO2  Av.8 %  Min: 89 %  Max: 97 %  24HR INTAKE/OUTPUT:      Intake/Output Summary (Last 24 hours) at 3/23/2021 1040  Last data filed at 3/23/2021 0356  Gross per 24 hour   Intake 1925.83 ml   Output 800 ml   Net 1125.83 ml       GEN: no distress. obese  HEENT: no icterus  NECK: Trachea midline  CV: RRR  LUNGS: rales bilaterally, unlabored  ABD: + bowel sounds. No distension. No  tenderness  EXT: no edema.    SKIN: no rash  NEURO: no tremor    Data      Lab Results   Component Value Date    CREATININE <0.5 (L) 2021    BUN 11 2021     (L) 2021    K 3.8 2021    CL 92 (L) 2021    CO2 23 2021     Lab Results   Component Value Date    WBC 7.6 2021    HGB 11.0 (L) 2021    HCT 30.6 (L) 2021    MCV 80.3 2021     2021 ASSESSMENT     Patient Active Problem List   Diagnosis    BMI 35.0-35.9,adult    Hypertriglyceridemia    Lipoma of torso    Hot flashes    Hyperglycemia    Acquired hypothyroidism    Essential hypertension    Allergic rhinitis    Status post hysterectomy    headache    Lipoma of right lower extremity    Gastroesophageal reflux disease without esophagitis    Acute respiratory failure due to COVID-19 [facility])    Acute respiratory failure with hypoxia (HCC)       ASSESSMENT/PLAN:    # Hyponatremia  - asymptomatic   - low TSH noted   - normal serum cortisol  - baseline serum sodium 137 in 2/2020  - serum osm 268  - suspect SIADH related to pulmonary process   - Uric acid 3.2   - urine sodium < 20, UK is 12   - Uosm inappropriately concentrated at 233  - component of volume depletion noted   - may need to add NaCL tabs   - improving at appropriate rate  - continue 1.5L fluid restriction for now  - continue to hold HCTZ  - change labs to daily and DC call parameters    # COVID Pneumonia  - on O2  - pulmonary following  - on remdesivir and dexamethasone    # Hypokalemia  - mild  - give total of KCL 20 meq po x 1  - DC daily KCL dose while off HCTZ    # HTN  - BP adequate  - monitor off HCTZ      Available via [email or web address] secure messaging

## 2021-03-23 NOTE — PROGRESS NOTES
Pulmonary Progress Note    CC:  Follow up hypoxia, COVID    Subjective: On 7 liters of oxygen   Afebrile   She is doing ok  Some SOB and coughing with ambulation    ROS  SOB/cough with walking       Intake/Output Summary (Last 24 hours) at 3/23/2021 0739  Last data filed at 3/23/2021 0356  Gross per 24 hour   Intake 1925.83 ml   Output 800 ml   Net 1125.83 ml         PHYSICAL EXAM:  Blood pressure 116/66, pulse 66, temperature 97.6 °F (36.4 °C), temperature source Oral, resp. rate 18, height 5' 4\" (1.626 m), weight 206 lb 9.1 oz (93.7 kg), SpO2 91 %, not currently breastfeeding.'  Gen: No distress. Eyes: PERRL. No sclera icterus. No conjunctival injection. ENT: No discharge. Pharynx clear. External appearance of ears and nose normal.  Neck: Trachea midline. No obvious mass. Resp: Crackles, diminished  CV: Regular rate. Regular rhythm. No murmur or rub. GI: Non-tender. Non-distended. No hernia. Skin: Warm, dry, normal texture and turgor. No nodule on exposed extremities. Lymph: No cervical LAD. No supraclavicular LAD. M/S: No cyanosis. No clubbing. No joint deformity. Neuro: Moves all four extremities. CN 2-12 tested, no defect noted.   Ext:   no edema    Medications:    Scheduled Meds:   amLODIPine  10 mg Oral Daily    levothyroxine  50 mcg Oral Daily    losartan  100 mg Oral Daily    metoprolol  100 mg Oral BID    potassium chloride  10 mEq Oral Daily    rosuvastatin  5 mg Oral Daily    venlafaxine  37.5 mg Oral Daily    sodium chloride flush  10 mL Intravenous 2 times per day    enoxaparin  40 mg Subcutaneous BID    dexamethasone  20 mg Intravenous Q24H    remdesivir IVPB  100 mg Intravenous Q24H       Continuous Infusions:   sodium chloride 50 mL/hr at 03/23/21 0330       PRN Meds:  azelastine, benzonatate, sodium chloride flush, promethazine **OR** ondansetron, polyethylene glycol, acetaminophen **OR** acetaminophen, guaiFENesin-dextromethorphan, benzocaine-menthol, sodium chloride, albuterol sulfate HFA    Labs:  CBC:   Recent Labs     03/21/21 2313 03/22/21  0539 03/23/21  0538   WBC 5.8 2.7* 7.6   HGB 12.2 11.9* 11.0*   HCT 33.6* 33.5* 30.6*   MCV 80.6 80.8 80.3    250 295     BMP:   Recent Labs     03/21/21 2313 03/22/21  0539 03/22/21 2017   * 120* 122*   K 3.2* 3.8 3.6   CL 81* 83* 88*   CO2 25 23 26   PHOS  --   --  1.8*   BUN 9 10 10   CREATININE 0.5* <0.5* 0.5*     LIVER PROFILE:   Recent Labs     03/21/21 2313 03/22/21  0539 03/23/21  0538   AST 38* 32 21   ALT 22 22 19   BILIDIR  --   --  <0.2   BILITOT 0.6 0.6 0.4   ALKPHOS 68 75 69     PT/INR: No results for input(s): PROTIME, INR in the last 72 hours. APTT: No results for input(s): APTT in the last 72 hours. UA:  Recent Labs     03/22/21  0615   COLORU YELLOW   PHUR 7.0   WBCUA 8*   RBCUA 2   BACTERIA 1+*   CLARITYU CLOUDY*   SPECGRAV 1.008   LEUKOCYTESUR MODERATE*   UROBILINOGEN 0.2   BILIRUBINUR Negative   BLOODU Negative   GLUCOSEU Negative     No results for input(s): PH, PCO2, PO2 in the last 72 hours. ECHO: 2017  Normal left ventricle size, wall thickness and systolic function with an   estimated ejection fraction of 60-65%.   No regional wall motion abnormalities are seen.   The right ventricle is normal in size and function.     ABG:  None     Chest X-ray:  Chest imaging was reviewed by me and showed bilateral airspace disase     I reviewed all the above labs and studies pertaining to this visit.        ASSESSMENT/PLAN:  · Acute Hypoxic Respiratory Failure with saturations less than 90% on room air, secondary to COVID-19 Pneumonia  · Titrate oxygen for saturations greater than or equal to 88%  ? Decadron 20 mg for 5 days, then 10 mg for 5 days  ? Remdesivir protocol   · BMI 35  ? Restrict calories   · Hyponatremia   ? Legionella antigen negative   ?  Nephrology following         Ramon Members, DO  [facility] Pulmonary

## 2021-03-23 NOTE — PLAN OF CARE
Problem: Airway Clearance - Ineffective  Goal: Achieve or maintain patent airway  3/23/2021 0940 by Florentino Kat RN  Outcome: Ongoing   Pt tolerating 6L of oxygen well. Airway has remained patent. Problem: Gas Exchange - Impaired  Goal: Absence of hypoxia  3/23/2021 0940 by Florentino Kat RN  Outcome: Ongoing     Problem: Breathing Pattern - Ineffective  Goal: Ability to achieve and maintain a regular respiratory rate  3/23/2021 0940 by Florentino Kat RN  Outcome: Ongoing     Problem:  Body Temperature -  Risk of, Imbalanced  Goal: Complications related to the disease process, condition or treatment will be avoided or minimized  3/23/2021 0940 by Florentino Kat RN  Outcome: Ongoing     Problem: Risk for Fluid Volume Deficit  Goal: Maintain normal heart rhythm  3/23/2021 0940 by Florentino Kat RN  Outcome: Ongoing

## 2021-03-24 LAB
ALBUMIN SERPL-MCNC: 3.2 G/DL (ref 3.4–5)
ALP BLD-CCNC: 68 U/L (ref 40–129)
ALT SERPL-CCNC: 19 U/L (ref 10–40)
ANION GAP SERPL CALCULATED.3IONS-SCNC: 10 MMOL/L (ref 3–16)
AST SERPL-CCNC: 18 U/L (ref 15–37)
BASOPHILS ABSOLUTE: 0 K/UL (ref 0–0.2)
BASOPHILS RELATIVE PERCENT: 0.1 %
BILIRUB SERPL-MCNC: 0.3 MG/DL (ref 0–1)
BILIRUBIN DIRECT: <0.2 MG/DL (ref 0–0.3)
BILIRUBIN, INDIRECT: ABNORMAL MG/DL (ref 0–1)
BUN BLDV-MCNC: 12 MG/DL (ref 7–20)
CALCIUM SERPL-MCNC: 8.9 MG/DL (ref 8.3–10.6)
CHLORIDE BLD-SCNC: 96 MMOL/L (ref 99–110)
CO2: 24 MMOL/L (ref 21–32)
CREAT SERPL-MCNC: <0.5 MG/DL (ref 0.6–1.1)
EOSINOPHILS ABSOLUTE: 0 K/UL (ref 0–0.6)
EOSINOPHILS RELATIVE PERCENT: 0 %
GFR AFRICAN AMERICAN: >60
GFR NON-AFRICAN AMERICAN: >60
GLUCOSE BLD-MCNC: 237 MG/DL (ref 70–99)
HCT VFR BLD CALC: 33.2 % (ref 36–48)
HEMOGLOBIN: 11.8 G/DL (ref 12–16)
LYMPHOCYTES ABSOLUTE: 0.5 K/UL (ref 1–5.1)
LYMPHOCYTES RELATIVE PERCENT: 5 %
MCH RBC QN AUTO: 29.1 PG (ref 26–34)
MCHC RBC AUTO-ENTMCNC: 35.6 G/DL (ref 31–36)
MCV RBC AUTO: 81.6 FL (ref 80–100)
MONOCYTES ABSOLUTE: 0.5 K/UL (ref 0–1.3)
MONOCYTES RELATIVE PERCENT: 4.3 %
NEUTROPHILS ABSOLUTE: 9.7 K/UL (ref 1.7–7.7)
NEUTROPHILS RELATIVE PERCENT: 90.6 %
PDW BLD-RTO: 13.1 % (ref 12.4–15.4)
PHOSPHORUS: 2.6 MG/DL (ref 2.5–4.9)
PLATELET # BLD: 340 K/UL (ref 135–450)
PMV BLD AUTO: 6.9 FL (ref 5–10.5)
POTASSIUM SERPL-SCNC: 4 MMOL/L (ref 3.5–5.1)
RBC # BLD: 4.07 M/UL (ref 4–5.2)
SODIUM BLD-SCNC: 130 MMOL/L (ref 136–145)
TOTAL PROTEIN: 6.2 G/DL (ref 6.4–8.2)
WBC # BLD: 10.7 K/UL (ref 4–11)

## 2021-03-24 PROCEDURE — 99233 SBSQ HOSP IP/OBS HIGH 50: CPT | Performed by: INTERNAL MEDICINE

## 2021-03-24 PROCEDURE — 80069 RENAL FUNCTION PANEL: CPT

## 2021-03-24 PROCEDURE — 2580000003 HC RX 258: Performed by: INTERNAL MEDICINE

## 2021-03-24 PROCEDURE — 36415 COLL VENOUS BLD VENIPUNCTURE: CPT

## 2021-03-24 PROCEDURE — 6360000002 HC RX W HCPCS: Performed by: NURSE PRACTITIONER

## 2021-03-24 PROCEDURE — 6370000000 HC RX 637 (ALT 250 FOR IP): Performed by: INTERNAL MEDICINE

## 2021-03-24 PROCEDURE — 2700000000 HC OXYGEN THERAPY PER DAY

## 2021-03-24 PROCEDURE — 2580000003 HC RX 258: Performed by: NURSE PRACTITIONER

## 2021-03-24 PROCEDURE — 6370000000 HC RX 637 (ALT 250 FOR IP): Performed by: NURSE PRACTITIONER

## 2021-03-24 PROCEDURE — 80076 HEPATIC FUNCTION PANEL: CPT

## 2021-03-24 PROCEDURE — 94761 N-INVAS EAR/PLS OXIMETRY MLT: CPT

## 2021-03-24 PROCEDURE — 85025 COMPLETE CBC W/AUTO DIFF WBC: CPT

## 2021-03-24 PROCEDURE — 2500000003 HC RX 250 WO HCPCS: Performed by: INTERNAL MEDICINE

## 2021-03-24 PROCEDURE — 2060000000 HC ICU INTERMEDIATE R&B

## 2021-03-24 PROCEDURE — 6360000002 HC RX W HCPCS: Performed by: INTERNAL MEDICINE

## 2021-03-24 RX ORDER — NICOTINE POLACRILEX 4 MG
15 LOZENGE BUCCAL PRN
Status: DISCONTINUED | OUTPATIENT
Start: 2021-03-24 | End: 2021-03-28 | Stop reason: HOSPADM

## 2021-03-24 RX ORDER — DEXTROSE MONOHYDRATE 50 MG/ML
100 INJECTION, SOLUTION INTRAVENOUS PRN
Status: DISCONTINUED | OUTPATIENT
Start: 2021-03-24 | End: 2021-03-28 | Stop reason: HOSPADM

## 2021-03-24 RX ORDER — DEXTROSE MONOHYDRATE 25 G/50ML
12.5 INJECTION, SOLUTION INTRAVENOUS PRN
Status: DISCONTINUED | OUTPATIENT
Start: 2021-03-24 | End: 2021-03-28 | Stop reason: HOSPADM

## 2021-03-24 RX ADMIN — POTASSIUM CHLORIDE 20 MEQ: 1500 TABLET, EXTENDED RELEASE ORAL at 08:23

## 2021-03-24 RX ADMIN — SODIUM CHLORIDE, PRESERVATIVE FREE 10 ML: 5 INJECTION INTRAVENOUS at 08:23

## 2021-03-24 RX ADMIN — ENOXAPARIN SODIUM 40 MG: 40 INJECTION SUBCUTANEOUS at 22:00

## 2021-03-24 RX ADMIN — HYDROCODONE BITARTRATE AND HOMATROPINE METHYLBROMIDE 5 ML: 5; 1.5 SOLUTION ORAL at 11:21

## 2021-03-24 RX ADMIN — AMITRIPTYLINE HYDROCHLORIDE 25 MG: 25 TABLET, FILM COATED ORAL at 22:01

## 2021-03-24 RX ADMIN — BENZOCAINE AND MENTHOL 1 LOZENGE: 15; 3.6 LOZENGE ORAL at 22:15

## 2021-03-24 RX ADMIN — BENZOCAINE AND MENTHOL 1 LOZENGE: 15; 3.6 LOZENGE ORAL at 11:21

## 2021-03-24 RX ADMIN — LOSARTAN POTASSIUM 50 MG: 50 TABLET, FILM COATED ORAL at 08:23

## 2021-03-24 RX ADMIN — SODIUM CHLORIDE: 9 INJECTION, SOLUTION INTRAVENOUS at 05:08

## 2021-03-24 RX ADMIN — LEVOTHYROXINE SODIUM 50 MCG: 0.05 TABLET ORAL at 05:05

## 2021-03-24 RX ADMIN — GUAIFENESIN SYRUP AND DEXTROMETHORPHAN 5 ML: 100; 10 SYRUP ORAL at 05:04

## 2021-03-24 RX ADMIN — POTASSIUM CHLORIDE 20 MEQ: 1500 TABLET, EXTENDED RELEASE ORAL at 16:47

## 2021-03-24 RX ADMIN — VENLAFAXINE HYDROCHLORIDE 37.5 MG: 37.5 CAPSULE, EXTENDED RELEASE ORAL at 08:29

## 2021-03-24 RX ADMIN — DEXAMETHASONE SODIUM PHOSPHATE 20 MG: 4 INJECTION, SOLUTION INTRA-ARTICULAR; INTRALESIONAL; INTRAMUSCULAR; INTRAVENOUS; SOFT TISSUE at 22:00

## 2021-03-24 RX ADMIN — SALINE NASAL SPRAY 1 SPRAY: 1.5 SOLUTION NASAL at 21:59

## 2021-03-24 RX ADMIN — AMLODIPINE BESYLATE 10 MG: 10 TABLET ORAL at 08:23

## 2021-03-24 RX ADMIN — REMDESIVIR 100 MG: 100 INJECTION, POWDER, LYOPHILIZED, FOR SOLUTION INTRAVENOUS at 11:10

## 2021-03-24 RX ADMIN — METOPROLOL TARTRATE 100 MG: 50 TABLET, FILM COATED ORAL at 22:01

## 2021-03-24 RX ADMIN — HYDROCODONE BITARTRATE AND HOMATROPINE METHYLBROMIDE 5 ML: 5; 1.5 SOLUTION ORAL at 22:15

## 2021-03-24 RX ADMIN — SALINE NASAL SPRAY 1 SPRAY: 1.5 SOLUTION NASAL at 11:21

## 2021-03-24 RX ADMIN — BENZOCAINE AND MENTHOL 1 LOZENGE: 15; 3.6 LOZENGE ORAL at 05:04

## 2021-03-24 RX ADMIN — ENOXAPARIN SODIUM 40 MG: 40 INJECTION SUBCUTANEOUS at 08:23

## 2021-03-24 RX ADMIN — METOPROLOL TARTRATE 100 MG: 50 TABLET, FILM COATED ORAL at 08:23

## 2021-03-24 RX ADMIN — ROSUVASTATIN CALCIUM 5 MG: 10 TABLET, FILM COATED ORAL at 08:23

## 2021-03-24 RX ADMIN — SODIUM CHLORIDE, PRESERVATIVE FREE 10 ML: 5 INJECTION INTRAVENOUS at 22:01

## 2021-03-24 ASSESSMENT — PAIN SCALES - GENERAL
PAINLEVEL_OUTOF10: 0

## 2021-03-24 NOTE — PROGRESS NOTES
Hospitalist Progress Note      PCP: Anila Gracia MD    Date of Admission: 3/21/2021    Subjective: still feeling SOB, +cough, O2 requirement increased    Medications:  Reviewed    Infusion Medications    sodium chloride 50 mL/hr at 03/23/21 0330     Scheduled Medications    amitriptyline  25 mg Oral Nightly    potassium chloride  20 mEq Oral BID WC    amLODIPine  10 mg Oral Daily    levothyroxine  50 mcg Oral Daily    losartan  100 mg Oral Daily    metoprolol  100 mg Oral BID    rosuvastatin  5 mg Oral Daily    venlafaxine  37.5 mg Oral Daily    sodium chloride flush  10 mL Intravenous 2 times per day    enoxaparin  40 mg Subcutaneous BID    dexamethasone  20 mg Intravenous Q24H    remdesivir IVPB  100 mg Intravenous Q24H     PRN Meds: sodium chloride, azelastine, benzonatate, sodium chloride flush, promethazine **OR** ondansetron, polyethylene glycol, acetaminophen **OR** acetaminophen, guaiFENesin-dextromethorphan, benzocaine-menthol, sodium chloride, albuterol sulfate HFA      Intake/Output Summary (Last 24 hours) at 3/23/2021 2027  Last data filed at 3/23/2021 1400  Gross per 24 hour   Intake 2345.83 ml   Output 600 ml   Net 1745.83 ml       Physical Exam Performed:    /69   Pulse 72   Temp 97.9 °F (36.6 °C) (Oral)   Resp 18   Ht 5' 4\" (1.626 m)   Wt 206 lb 9.1 oz (93.7 kg)   SpO2 91%   BMI 35.46 kg/m²     General appearance: Ill-appearing   HEENT:  Normal cephalic, atraumatic without obvious deformity. Pupils equal, round, and reactive to light. Extra ocular muscles intact. Conjunctivae/corneas clear. Neck: Supple, with full range of motion. No jugular venous distention. Trachea midline. Respiratory: Increased respiratory effort. Bibasilar Rales, no accessory muscle use   Cardiovascular:  Regular rate and rhythm without murmurs, rubs or gallops. Abdomen: Soft, non-tender, non-distended, without rebound or guarding. Normal bowel sounds.   Musculoskeletal:  No clubbing, cyanosis or edema bilaterally. Full range of motion without deformity. Skin: Skin color, texture, turgor normal.  No rashes or lesions. Neurologic:  Neurovascularly intact without any focal sensory/motor deficits. Cranial nerves: II-XII intact, grossly non-focal.  Psychiatric:  Alert and oriented, thought content appropriate, normal insight  Capillary Refill: Brisk,< 3 seconds   Peripheral Pulses: +2 palpable, equal bilaterally        Labs:   Recent Labs     03/21/21 2313 03/22/21  0539 03/23/21  0538   WBC 5.8 2.7* 7.6   HGB 12.2 11.9* 11.0*   HCT 33.6* 33.5* 30.6*    250 295     Recent Labs     03/22/21 0539 03/22/21 2017 03/23/21  0538   * 122* 125*   K 3.8 3.6 3.8   CL 83* 88* 92*   CO2 23 26 23   BUN 10 10 11   CREATININE <0.5* 0.5* <0.5*   CALCIUM 8.7 8.5 8.9   PHOS  --  1.8* 2.6     Recent Labs     03/21/21 2313 03/22/21  0539 03/23/21  0538   AST 38* 32 21   ALT 22 22 19   BILIDIR  --   --  <0.2   BILITOT 0.6 0.6 0.4   ALKPHOS 68 75 69     No results for input(s): INR in the last 72 hours. Recent Labs     03/21/21 2313   TROPONINI <0.01       Urinalysis:      Lab Results   Component Value Date    NITRU Negative 03/22/2021    WBCUA 8 03/22/2021    BACTERIA 1+ 03/22/2021    RBCUA 2 03/22/2021    BLOODU Negative 03/22/2021    SPECGRAV 1.008 03/22/2021    GLUCOSEU Negative 03/22/2021       Radiology:  XR CHEST PORTABLE   Final Result   Multifocal bibasilar predominant airspace disease compatible with the   provided history of COVID-19 pneumonia.                  Assessment/Plan:    Active Hospital Problems    Diagnosis    [facility] [U07.1, J96.00]    Acute respiratory failure with hypoxia (HCC) [J96.01]    BMI 35.0-35.9,adult [Z68.35]    Acquired hypothyroidism [E03.9]    Essential hypertension [I10]          Covid 19 Pneumonia  - droplet plus isolation precautions  - Covid 19 swab + 3/12/2021 (Symptom onset 3/9/2021)  - Oxygen titrate to keep saturation > 90%  - Chest xray: Multifocal bibasilar predominant airspace disease compatible with COVID-19 pneumonia  - on Decadron/remdesivir  - consulted Pulmonology     Acute hypoxic respiratory failure   - due to Covid Pnuemonia  - with increased work of breath, tachypnea and hypoxia  - room air saturation 81%  - provide supplemental oxygen as necessary to keep SaO2 92% or greater.  - O2 requirement increased to 7L     Hyponatremia - improved  - na 118-->122-->125  - nephrology consulted in ED     Essential (primary) hypertension   - BP on the lower end  - continue home meds - decrease losartan     Hyperlipidemia   - continue statin     Hypothyroidism  - check tsh  - continue synthroid         DVT Prophylaxis: Lovenox  Diet: DIET GENERAL; Daily Fluid Restriction: 1500 ml  Code Status: Full Code    PT/OT Eval Status: ordered    Kiana Larve care    Michaela Rollins MD

## 2021-03-24 NOTE — PROGRESS NOTES
Pulmonary Progress Note    CC:  Follow up hypoxia, COVID    Subjective: On 8 liters of oxygen   Afebrile   Having dry/sore nose and coughing spells with ambulation  Not necessarily SOB but coughing is more the issues  No other changes     ROS  Coughing spells, worse with ambulation  Sore nose       Intake/Output Summary (Last 24 hours) at 3/24/2021 0735  Last data filed at 3/24/2021 4471  Gross per 24 hour   Intake 2459 ml   Output 1300 ml   Net 1159 ml         PHYSICAL EXAM:  Blood pressure 132/76, pulse 75, temperature 97.6 °F (36.4 °C), temperature source Oral, resp. rate 22, height 5' 4\" (1.626 m), weight 206 lb 9.1 oz (93.7 kg), SpO2 98 %, not currently breastfeeding.'  Gen: No distress. Eyes: PERRL. No sclera icterus. No conjunctival injection. ENT: No discharge. Pharynx clear. External appearance of ears and nose normal.  Neck: Trachea midline. No obvious mass. Resp: Crackles  CV: Regular rate. Regular rhythm. No murmur or rub. GI: Non-tender. Non-distended. No hernia. Skin: Warm, dry, normal texture and turgor. No nodule on exposed extremities. Lymph: No cervical LAD. No supraclavicular LAD. M/S: No cyanosis. No clubbing. No joint deformity. Neuro: Moves all four extremities. CN 2-12 tested, no defect noted.   Ext:   no edema    Medications:    Scheduled Meds:   amitriptyline  25 mg Oral Nightly    potassium chloride  20 mEq Oral BID WC    losartan  50 mg Oral Daily    amLODIPine  10 mg Oral Daily    levothyroxine  50 mcg Oral Daily    metoprolol  100 mg Oral BID    rosuvastatin  5 mg Oral Daily    venlafaxine  37.5 mg Oral Daily    sodium chloride flush  10 mL Intravenous 2 times per day    enoxaparin  40 mg Subcutaneous BID    dexamethasone  20 mg Intravenous Q24H    remdesivir IVPB  100 mg Intravenous Q24H       Continuous Infusions:   sodium chloride 50 mL/hr at 03/24/21 0508       PRN Meds:  sodium chloride, azelastine, benzonatate, sodium chloride flush, promethazine **OR** ondansetron, polyethylene glycol, acetaminophen **OR** acetaminophen, guaiFENesin-dextromethorphan, benzocaine-menthol, sodium chloride, albuterol sulfate HFA    Labs:  CBC:   Recent Labs     03/22/21  0539 03/23/21  0538 03/24/21  0529   WBC 2.7* 7.6 10.7   HGB 11.9* 11.0* 11.8*   HCT 33.5* 30.6* 33.2*   MCV 80.8 80.3 81.6    295 340     BMP:   Recent Labs     03/22/21 2017 03/23/21  0538 03/24/21  0529   * 125* 130*   K 3.6 3.8 4.0   CL 88* 92* 96*   CO2 26 23 24   PHOS 1.8* 2.6 2.6   BUN 10 11 12   CREATININE 0.5* <0.5* <0.5*     LIVER PROFILE:   Recent Labs     03/22/21  0539 03/23/21  0538 03/24/21  0529   AST 32 21 18   ALT 22 19 19   BILIDIR  --  <0.2 <0.2   BILITOT 0.6 0.4 0.3   ALKPHOS 75 69 68     PT/INR: No results for input(s): PROTIME, INR in the last 72 hours. APTT: No results for input(s): APTT in the last 72 hours. UA:  Recent Labs     03/22/21  0615   COLORU YELLOW   PHUR 7.0   WBCUA 8*   RBCUA 2   BACTERIA 1+*   CLARITYU CLOUDY*   SPECGRAV 1.008   LEUKOCYTESUR MODERATE*   UROBILINOGEN 0.2   BILIRUBINUR Negative   BLOODU Negative   GLUCOSEU Negative     No results for input(s): PH, PCO2, PO2 in the last 72 hours. ECHO: 2017  Normal left ventricle size, wall thickness and systolic function with an   estimated ejection fraction of 60-65%.   No regional wall motion abnormalities are seen.   The right ventricle is normal in size and function.     ABG:  None     Chest X-ray:  Chest imaging was reviewed by me and showed bilateral airspace disase     I reviewed all the above labs and studies pertaining to this visit.        ASSESSMENT/PLAN:  · Acute Hypoxic Respiratory Failure with saturations less than 90% on room air, secondary to COVID-19 Pneumonia  · Titrate oxygen for saturations greater than or equal to 88%  ? Decadron 20 mg for 5 days, then 10 mg for 5 days  ? Remdesivir protocol   ? Add ocean's spray PRN  ? Add Hycodan to robitussin   · BMI 35  ?  Restrict calories · Hyponatremia, improving   ? Legionella antigen negative   ? Nephrology following     Consider diuresis to help keep lung dry.   Listed at +2 liters since admission    Tsering Lacey, DO  [facility]

## 2021-03-24 NOTE — PLAN OF CARE
Problem: Airway Clearance - Ineffective  Goal: Achieve or maintain patent airway  Outcome: Ongoing     Problem: Gas Exchange - Impaired  Goal: Promote optimal lung function  Outcome: Ongoing     Problem: Breathing Pattern - Ineffective  Goal: Ability to achieve and maintain a regular respiratory rate  Outcome: Ongoing     Problem:  Body Temperature -  Risk of, Imbalanced  Goal: Will regain or maintain usual level of consciousness  Outcome: Ongoing     Problem: Risk for Fluid Volume Deficit  Goal: Maintain normal heart rhythm  Outcome: Ongoing     Problem: Risk for Fluid Volume Deficit  Goal: Maintain absence of muscle cramping  Outcome: Ongoing     Problem: Patient Education: Go to Patient Education Activity  Goal: Patient/Family Education  Outcome: Ongoing

## 2021-03-24 NOTE — PROGRESS NOTES
03/24/2021    MCV 81.6 03/24/2021     03/24/2021       ASSESSMENT     Patient Active Problem List   Diagnosis    BMI 35.0-35.9,adult    Hypertriglyceridemia    Lipoma of torso    Hot flashes    Hyperglycemia    Acquired hypothyroidism    Essential hypertension    Allergic rhinitis    Status post hysterectomy    headache    Lipoma of right lower extremity    Gastroesophageal reflux disease without esophagitis    Acute respiratory failure due to COVID-19 [facility])    Acute respiratory failure with hypoxia (HCC)       ASSESSMENT/PLAN:    # Hyponatremia  - asymptomatic   - low TSH noted   - normal serum cortisol  - baseline serum sodium 137 in 2/2020  - serum osm 268  - 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   - Uric acid 3.2   - urine sodium < 20, UK is 12   - Uosm inappropriately concentrated at 233  - component of volume depletion noted  - serum sodium improving at appropriate rate  - continue 1.5L fluid restriction for now   - will plan to relax restriction if serum sodium continues to improve  - continue to hold HCTZ  - monitor daily labs    # COVID Pneumonia  - on O2  - pulmonary following  - on remdesivir and dexamethasone    # Hypokalemia  - resolved with repletion  - continue to hold daily KCL dose while off HCTZ    # HTN  - BP adequate  - monitor off HCTZ      Available via [employer] secure messaging

## 2021-03-25 LAB
ALBUMIN SERPL-MCNC: 3.1 G/DL (ref 3.4–5)
ALP BLD-CCNC: 62 U/L (ref 40–129)
ALT SERPL-CCNC: 23 U/L (ref 10–40)
ANION GAP SERPL CALCULATED.3IONS-SCNC: 7 MMOL/L (ref 3–16)
AST SERPL-CCNC: 20 U/L (ref 15–37)
BASOPHILS ABSOLUTE: 0 K/UL (ref 0–0.2)
BASOPHILS RELATIVE PERCENT: 0.1 %
BILIRUB SERPL-MCNC: 0.4 MG/DL (ref 0–1)
BILIRUBIN DIRECT: <0.2 MG/DL (ref 0–0.3)
BILIRUBIN, INDIRECT: ABNORMAL MG/DL (ref 0–1)
BLOOD BANK DISPENSE STATUS: NORMAL
BLOOD BANK DISPENSE STATUS: NORMAL
BLOOD BANK PRODUCT CODE: NORMAL
BLOOD BANK PRODUCT CODE: NORMAL
BPU ID: NORMAL
BPU ID: NORMAL
BUN BLDV-MCNC: 12 MG/DL (ref 7–20)
CALCIUM SERPL-MCNC: 8.7 MG/DL (ref 8.3–10.6)
CHLORIDE BLD-SCNC: 94 MMOL/L (ref 99–110)
CO2: 25 MMOL/L (ref 21–32)
CREAT SERPL-MCNC: <0.5 MG/DL (ref 0.6–1.1)
DESCRIPTION BLOOD BANK: NORMAL
DESCRIPTION BLOOD BANK: NORMAL
EOSINOPHILS ABSOLUTE: 0 K/UL (ref 0–0.6)
EOSINOPHILS RELATIVE PERCENT: 0 %
ESTIMATED AVERAGE GLUCOSE: 137 MG/DL
GFR AFRICAN AMERICAN: >60
GFR NON-AFRICAN AMERICAN: >60
GLUCOSE BLD-MCNC: 191 MG/DL (ref 70–99)
GLUCOSE BLD-MCNC: 217 MG/DL (ref 70–99)
GLUCOSE BLD-MCNC: 262 MG/DL (ref 70–99)
GLUCOSE BLD-MCNC: 262 MG/DL (ref 70–99)
GLUCOSE BLD-MCNC: 270 MG/DL (ref 70–99)
GLUCOSE BLD-MCNC: 291 MG/DL (ref 70–99)
HBA1C MFR BLD: 6.4 %
HCT VFR BLD CALC: 32.4 % (ref 36–48)
HEMOGLOBIN: 11.3 G/DL (ref 12–16)
LYMPHOCYTES ABSOLUTE: 0.4 K/UL (ref 1–5.1)
LYMPHOCYTES RELATIVE PERCENT: 5.5 %
MCH RBC QN AUTO: 28.6 PG (ref 26–34)
MCHC RBC AUTO-ENTMCNC: 34.9 G/DL (ref 31–36)
MCV RBC AUTO: 81.8 FL (ref 80–100)
MONOCYTES ABSOLUTE: 0.3 K/UL (ref 0–1.3)
MONOCYTES RELATIVE PERCENT: 4.3 %
NEUTROPHILS ABSOLUTE: 7.1 K/UL (ref 1.7–7.7)
NEUTROPHILS RELATIVE PERCENT: 90.1 %
PDW BLD-RTO: 13.2 % (ref 12.4–15.4)
PERFORMED ON: ABNORMAL
PHOSPHORUS: 3 MG/DL (ref 2.5–4.9)
PLATELET # BLD: 342 K/UL (ref 135–450)
PMV BLD AUTO: 6.7 FL (ref 5–10.5)
POTASSIUM SERPL-SCNC: 4.8 MMOL/L (ref 3.5–5.1)
RBC # BLD: 3.96 M/UL (ref 4–5.2)
SODIUM BLD-SCNC: 126 MMOL/L (ref 136–145)
TOTAL PROTEIN: 5.5 G/DL (ref 6.4–8.2)
WBC # BLD: 7.8 K/UL (ref 4–11)

## 2021-03-25 PROCEDURE — 2580000003 HC RX 258: Performed by: INTERNAL MEDICINE

## 2021-03-25 PROCEDURE — 99233 SBSQ HOSP IP/OBS HIGH 50: CPT | Performed by: INTERNAL MEDICINE

## 2021-03-25 PROCEDURE — 2060000000 HC ICU INTERMEDIATE R&B

## 2021-03-25 PROCEDURE — 6370000000 HC RX 637 (ALT 250 FOR IP): Performed by: INTERNAL MEDICINE

## 2021-03-25 PROCEDURE — 2500000003 HC RX 250 WO HCPCS: Performed by: INTERNAL MEDICINE

## 2021-03-25 PROCEDURE — 6370000000 HC RX 637 (ALT 250 FOR IP): Performed by: NURSE PRACTITIONER

## 2021-03-25 PROCEDURE — 83036 HEMOGLOBIN GLYCOSYLATED A1C: CPT

## 2021-03-25 PROCEDURE — 6360000002 HC RX W HCPCS: Performed by: NURSE PRACTITIONER

## 2021-03-25 PROCEDURE — 94761 N-INVAS EAR/PLS OXIMETRY MLT: CPT

## 2021-03-25 PROCEDURE — 85025 COMPLETE CBC W/AUTO DIFF WBC: CPT

## 2021-03-25 PROCEDURE — 80076 HEPATIC FUNCTION PANEL: CPT

## 2021-03-25 PROCEDURE — 36415 COLL VENOUS BLD VENIPUNCTURE: CPT

## 2021-03-25 PROCEDURE — 2580000003 HC RX 258: Performed by: NURSE PRACTITIONER

## 2021-03-25 PROCEDURE — 6360000002 HC RX W HCPCS: Performed by: INTERNAL MEDICINE

## 2021-03-25 PROCEDURE — 2700000000 HC OXYGEN THERAPY PER DAY

## 2021-03-25 PROCEDURE — 80069 RENAL FUNCTION PANEL: CPT

## 2021-03-25 RX ORDER — DEXTROSE MONOHYDRATE 50 MG/ML
100 INJECTION, SOLUTION INTRAVENOUS PRN
Status: DISCONTINUED | OUTPATIENT
Start: 2021-03-25 | End: 2021-03-25 | Stop reason: SDUPTHER

## 2021-03-25 RX ORDER — NICOTINE POLACRILEX 4 MG
15 LOZENGE BUCCAL PRN
Status: DISCONTINUED | OUTPATIENT
Start: 2021-03-25 | End: 2021-03-25 | Stop reason: SDUPTHER

## 2021-03-25 RX ORDER — SODIUM CHLORIDE 1000 MG
1 TABLET, SOLUBLE MISCELLANEOUS
Status: DISCONTINUED | OUTPATIENT
Start: 2021-03-25 | End: 2021-03-27

## 2021-03-25 RX ORDER — DEXTROSE MONOHYDRATE 25 G/50ML
12.5 INJECTION, SOLUTION INTRAVENOUS PRN
Status: DISCONTINUED | OUTPATIENT
Start: 2021-03-25 | End: 2021-03-25 | Stop reason: SDUPTHER

## 2021-03-25 RX ORDER — INSULIN GLARGINE 100 [IU]/ML
15 INJECTION, SOLUTION SUBCUTANEOUS NIGHTLY
Status: DISCONTINUED | OUTPATIENT
Start: 2021-03-25 | End: 2021-03-28 | Stop reason: HOSPADM

## 2021-03-25 RX ADMIN — METOPROLOL TARTRATE 100 MG: 50 TABLET, FILM COATED ORAL at 08:43

## 2021-03-25 RX ADMIN — Medication 1 G: at 08:50

## 2021-03-25 RX ADMIN — ENOXAPARIN SODIUM 40 MG: 40 INJECTION SUBCUTANEOUS at 21:37

## 2021-03-25 RX ADMIN — METOPROLOL TARTRATE 100 MG: 50 TABLET, FILM COATED ORAL at 21:38

## 2021-03-25 RX ADMIN — Medication 1 G: at 19:03

## 2021-03-25 RX ADMIN — HYDROCODONE BITARTRATE AND HOMATROPINE METHYLBROMIDE 5 ML: 5; 1.5 SOLUTION ORAL at 05:10

## 2021-03-25 RX ADMIN — GUAIFENESIN SYRUP AND DEXTROMETHORPHAN 5 ML: 100; 10 SYRUP ORAL at 12:56

## 2021-03-25 RX ADMIN — INSULIN LISPRO 1 UNITS: 100 INJECTION, SOLUTION INTRAVENOUS; SUBCUTANEOUS at 00:37

## 2021-03-25 RX ADMIN — LEVOTHYROXINE SODIUM 50 MCG: 0.05 TABLET ORAL at 06:42

## 2021-03-25 RX ADMIN — BENZOCAINE AND MENTHOL 1 LOZENGE: 15; 3.6 LOZENGE ORAL at 05:10

## 2021-03-25 RX ADMIN — INSULIN LISPRO 6 UNITS: 100 INJECTION, SOLUTION INTRAVENOUS; SUBCUTANEOUS at 18:00

## 2021-03-25 RX ADMIN — BENZOCAINE AND MENTHOL 1 LOZENGE: 15; 3.6 LOZENGE ORAL at 12:55

## 2021-03-25 RX ADMIN — AMITRIPTYLINE HYDROCHLORIDE 25 MG: 25 TABLET, FILM COATED ORAL at 21:37

## 2021-03-25 RX ADMIN — DEXAMETHASONE SODIUM PHOSPHATE 20 MG: 4 INJECTION, SOLUTION INTRA-ARTICULAR; INTRALESIONAL; INTRAMUSCULAR; INTRAVENOUS; SOFT TISSUE at 21:37

## 2021-03-25 RX ADMIN — ROSUVASTATIN CALCIUM 5 MG: 10 TABLET, FILM COATED ORAL at 08:43

## 2021-03-25 RX ADMIN — POTASSIUM CHLORIDE 20 MEQ: 1500 TABLET, EXTENDED RELEASE ORAL at 08:43

## 2021-03-25 RX ADMIN — INSULIN LISPRO 5 UNITS: 100 INJECTION, SOLUTION INTRAVENOUS; SUBCUTANEOUS at 21:38

## 2021-03-25 RX ADMIN — SODIUM CHLORIDE, PRESERVATIVE FREE 10 ML: 5 INJECTION INTRAVENOUS at 12:57

## 2021-03-25 RX ADMIN — INSULIN GLARGINE 15 UNITS: 100 INJECTION, SOLUTION SUBCUTANEOUS at 21:38

## 2021-03-25 RX ADMIN — AMLODIPINE BESYLATE 10 MG: 10 TABLET ORAL at 08:43

## 2021-03-25 RX ADMIN — INSULIN LISPRO 3 UNITS: 100 INJECTION, SOLUTION INTRAVENOUS; SUBCUTANEOUS at 08:45

## 2021-03-25 RX ADMIN — INSULIN LISPRO 9 UNITS: 100 INJECTION, SOLUTION INTRAVENOUS; SUBCUTANEOUS at 12:30

## 2021-03-25 RX ADMIN — REMDESIVIR 100 MG: 100 INJECTION, POWDER, LYOPHILIZED, FOR SOLUTION INTRAVENOUS at 12:46

## 2021-03-25 RX ADMIN — VENLAFAXINE HYDROCHLORIDE 37.5 MG: 37.5 CAPSULE, EXTENDED RELEASE ORAL at 08:43

## 2021-03-25 RX ADMIN — Medication 1 G: at 12:45

## 2021-03-25 RX ADMIN — ENOXAPARIN SODIUM 40 MG: 40 INJECTION SUBCUTANEOUS at 08:43

## 2021-03-25 RX ADMIN — GUAIFENESIN SYRUP AND DEXTROMETHORPHAN 5 ML: 100; 10 SYRUP ORAL at 21:37

## 2021-03-25 RX ADMIN — BENZOCAINE AND MENTHOL 1 LOZENGE: 15; 3.6 LOZENGE ORAL at 21:37

## 2021-03-25 RX ADMIN — LOSARTAN POTASSIUM 50 MG: 50 TABLET, FILM COATED ORAL at 08:43

## 2021-03-25 RX ADMIN — BENZONATATE 200 MG: 100 CAPSULE ORAL at 21:38

## 2021-03-25 RX ADMIN — SODIUM CHLORIDE, PRESERVATIVE FREE 10 ML: 5 INJECTION INTRAVENOUS at 21:37

## 2021-03-25 NOTE — PROGRESS NOTES
Hospitalist Progress Note      PCP: Isaiah Roper MD    Date of Admission: 3/21/2021    Subjective: feeling SOB better, sleeping prone. Na level dropped    Medications:  Reviewed    Infusion Medications    dextrose       Scheduled Medications    sodium chloride  1 g Oral TID     [START ON 3/28/2021] dexamethasone  10 mg Oral Daily    insulin lispro  0-18 Units Subcutaneous TID     insulin lispro  0-9 Units Subcutaneous Nightly    insulin glargine  15 Units Subcutaneous Nightly    amitriptyline  25 mg Oral Nightly    [Held by provider] potassium chloride  20 mEq Oral BID     losartan  50 mg Oral Daily    amLODIPine  10 mg Oral Daily    levothyroxine  50 mcg Oral Daily    metoprolol  100 mg Oral BID    rosuvastatin  5 mg Oral Daily    venlafaxine  37.5 mg Oral Daily    sodium chloride flush  10 mL Intravenous 2 times per day    enoxaparin  40 mg Subcutaneous BID    dexamethasone  20 mg Intravenous Q24H    remdesivir IVPB  100 mg Intravenous Q24H     PRN Meds: sodium chloride, HYDROcodone-homatropine, glucose, dextrose, glucagon (rDNA), dextrose, azelastine, benzonatate, sodium chloride flush, promethazine **OR** ondansetron, polyethylene glycol, acetaminophen **OR** acetaminophen, guaiFENesin-dextromethorphan, benzocaine-menthol, sodium chloride, albuterol sulfate HFA      Intake/Output Summary (Last 24 hours) at 3/25/2021 1752  Last data filed at 3/25/2021 1201  Gross per 24 hour   Intake 1050 ml   Output 1300 ml   Net -250 ml       Physical Exam Performed:    BP (!) 154/78   Pulse 61   Temp 98.1 °F (36.7 °C) (Oral)   Resp 18   Ht 5' 4\" (1.626 m)   Wt 206 lb 12.7 oz (93.8 kg)   SpO2 95%   BMI 35.50 kg/m²      General appearance: Ill-appearing   HEENT:  Normal cephalic, atraumatic without obvious deformity. Pupils equal, round, and reactive to light.  Extra ocular muscles intact. Conjunctivae/corneas clear. Neck: Supple, with full range of motion. No jugular venous distention.

## 2021-03-25 NOTE — PROGRESS NOTES
Pulmonary Progress Note    CC:  Follow up hypoxia, COVID    Subjective: On 6 liters of oxygen but took off oxygen to walk. She was saturating 79%  She felt ok during this time  Still coughing       ROS  Coughing  SOB      Intake/Output Summary (Last 24 hours) at 3/25/2021 0726  Last data filed at 3/25/2021 0643  Gross per 24 hour   Intake 990 ml   Output 1300 ml   Net -310 ml         PHYSICAL EXAM:  Blood pressure 125/68, pulse 55, temperature 97.5 °F (36.4 °C), temperature source Oral, resp. rate 18, height 5' 4\" (1.626 m), weight 206 lb 12.7 oz (93.8 kg), SpO2 92 %, not currently breastfeeding.'  Gen: No distress. Eyes: PERRL. No sclera icterus. No conjunctival injection. ENT: No discharge. Pharynx clear. External appearance of ears and nose normal.  Neck: Trachea midline. No obvious mass. Resp: Crackles  CV: Regular rate. Regular rhythm. No murmur or rub. GI: Non-tender. Non-distended. No hernia. Skin: Warm, dry, normal texture and turgor. No nodule on exposed extremities. Lymph: No cervical LAD. No supraclavicular LAD. M/S: No cyanosis. No clubbing. No joint deformity. Neuro: Moves all four extremities. CN 2-12 tested, no defect noted.   Ext:   no edema    Medications:    Scheduled Meds:   [START ON 3/28/2021] dexamethasone  10 mg Oral 2 times per day    insulin lispro  0-6 Units Subcutaneous TID     insulin lispro  0-3 Units Subcutaneous Nightly    amitriptyline  25 mg Oral Nightly    potassium chloride  20 mEq Oral BID     losartan  50 mg Oral Daily    amLODIPine  10 mg Oral Daily    levothyroxine  50 mcg Oral Daily    metoprolol  100 mg Oral BID    rosuvastatin  5 mg Oral Daily    venlafaxine  37.5 mg Oral Daily    sodium chloride flush  10 mL Intravenous 2 times per day    enoxaparin  40 mg Subcutaneous BID    dexamethasone  20 mg Intravenous Q24H    remdesivir IVPB  100 mg Intravenous Q24H       Continuous Infusions:   dextrose      sodium chloride 50 mL/hr at 03/24/21 0508       PRN Meds:  sodium chloride, HYDROcodone-homatropine, glucose, dextrose, glucagon (rDNA), dextrose, azelastine, benzonatate, sodium chloride flush, promethazine **OR** ondansetron, polyethylene glycol, acetaminophen **OR** acetaminophen, guaiFENesin-dextromethorphan, benzocaine-menthol, sodium chloride, albuterol sulfate HFA    Labs:  CBC:   Recent Labs     03/23/21  0538 03/24/21  0529 03/25/21  0545   WBC 7.6 10.7 7.8   HGB 11.0* 11.8* 11.3*   HCT 30.6* 33.2* 32.4*   MCV 80.3 81.6 81.8    340 342     BMP:   Recent Labs     03/23/21  0538 03/24/21  0529 03/25/21  0545   * 130* 126*   K 3.8 4.0 4.8   CL 92* 96* 94*   CO2 23 24 25   PHOS 2.6 2.6 3.0   BUN 11 12 12   CREATININE <0.5* <0.5* <0.5*     LIVER PROFILE:   Recent Labs     03/23/21  0538 03/24/21  0529 03/25/21  0545   AST 21 18 20   ALT 19 19 23   BILIDIR <0.2 <0.2 <0.2   BILITOT 0.4 0.3 0.4   ALKPHOS 69 68 62     PT/INR: No results for input(s): PROTIME, INR in the last 72 hours. APTT: No results for input(s): APTT in the last 72 hours. UA:  No results for input(s): NITRITE, COLORU, PHUR, LABCAST, WBCUA, RBCUA, MUCUS, TRICHOMONAS, YEAST, BACTERIA, CLARITYU, SPECGRAV, LEUKOCYTESUR, UROBILINOGEN, BILIRUBINUR, BLOODU, GLUCOSEU, AMORPHOUS in the last 72 hours. Invalid input(s): Lisa Jewell  No results for input(s): PH, PCO2, PO2 in the last 72 hours.         ECHO: 2017  Normal left ventricle size, wall thickness and systolic function with an   estimated ejection fraction of 60-65%.   No regional wall motion abnormalities are seen.   The right ventricle is normal in size and function.     ABG:  None     Chest X-ray:  Chest imaging was reviewed by me and showed bilateral airspace disase     I reviewed all the above labs and studies pertaining to this visit.        ASSESSMENT/PLAN:  · Acute Hypoxic Respiratory Failure with saturations less than 90% on room air, secondary to COVID-19 Pneumonia  · Titrate oxygen for saturations greater than or equal to 88%  ? Decadron 20 mg for 5 days, then 10 mg for 5 days  ? Remdesivir protocol   ? Henefer's spray PRN  ? PRN cough meds  · BMI 35  ? Restrict calories   · Hyponatremia, improving   ? Legionella antigen negative   ?  Nephrology following         Sukhwinder Martinez, DO  [facility]

## 2021-03-25 NOTE — PROGRESS NOTES
CO2 25 03/25/2021     Lab Results   Component Value Date    WBC 7.8 03/25/2021    HGB 11.3 (L) 03/25/2021    HCT 32.4 (L) 03/25/2021    MCV 81.8 03/25/2021     03/25/2021       ASSESSMENT     Patient Active Problem List   Diagnosis    BMI 35.0-35.9,adult    Hypertriglyceridemia    Lipoma of torso    Hot flashes    Hyperglycemia    Acquired hypothyroidism    Essential hypertension    Allergic rhinitis    Status post hysterectomy    headache    Lipoma of right lower extremity    Gastroesophageal reflux disease without esophagitis    Acute respiratory failure due to COVID-19 [facility])    Acute respiratory failure with hypoxia (HCC)       ASSESSMENT/PLAN:    # Hyponatremia  - asymptomatic   - low TSH noted   - normal serum cortisol  - baseline serum sodium 137 in 2/2020  - serum osm 268  - 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

## 2021-03-25 NOTE — PLAN OF CARE
Problem: Airway Clearance - Ineffective  Goal: Achieve or maintain patent airway  Outcome: Ongoing     Problem: Gas Exchange - Impaired  Goal: Absence of hypoxia  Outcome: Ongoing     Problem: Gas Exchange - Impaired  Goal: Promote optimal lung function  Outcome: Ongoing     Problem: Breathing Pattern - Ineffective  Goal: Ability to achieve and maintain a regular respiratory rate  Outcome: Ongoing     Problem: Isolation Precautions - Risk of Spread of Infection  Goal: Prevent transmission of infection  Outcome: Ongoing     Problem: Nutrition Deficits  Goal: Optimize nutritional status  Outcome: Ongoing     Problem: Risk for Fluid Volume Deficit  Goal: Maintain normal serum potassium, sodium, calcium, phosphorus, and pH  Outcome: Ongoing     Problem: Loneliness or Risk for Loneliness  Goal: Demonstrate positive use of time alone when socialization is not possible  Outcome: Ongoing     Problem: Fatigue  Goal: Verbalize increase energy and improved vitality  Outcome: Ongoing

## 2021-03-25 NOTE — PLAN OF CARE
Problem: Body Temperature -  Risk of, Imbalanced  Goal: Ability to maintain a body temperature within defined limits  3/25/2021 0009 by Rubén Pelaez RN  Outcome: Met This Shift     Problem: Body Temperature -  Risk of, Imbalanced  Goal: Will regain or maintain usual level of consciousness  3/25/2021 0009 by Rubén Pelaez RN  Outcome: Met This Shift     Problem: Risk for Fluid Volume Deficit  Goal: Maintain normal heart rhythm  3/25/2021 0009 by Rubén Pelaez RN  Outcome: Met This Shift     Problem: Airway Clearance - Ineffective  Goal: Achieve or maintain patent airway  3/25/2021 0009 by Rubén Pelaez RN  Outcome: Ongoing  Note: Oxygen at 6 liters, decreased from yesterday. Saturations within normal limits with some drops during activity. Reports overall feeling of breathing improvement. Problem: Gas Exchange - Impaired  Goal: Absence of hypoxia  3/25/2021 0009 by Rubén Pelaez RN  Outcome: Ongoing  Note: Continues with desaturations with activity. On continuous monitoring.

## 2021-03-25 NOTE — PROGRESS NOTES
Hospitalist Progress Note      PCP: Seda Nina MD    Date of Admission: 3/21/2021     Subjective: SOB improved, +cough, Na better    Medications:  Reviewed    Infusion Medications    sodium chloride 50 mL/hr at 03/24/21 0508     Scheduled Medications    [START ON 3/28/2021] dexamethasone  10 mg Oral 2 times per day    amitriptyline  25 mg Oral Nightly    potassium chloride  20 mEq Oral BID WC    losartan  50 mg Oral Daily    amLODIPine  10 mg Oral Daily    levothyroxine  50 mcg Oral Daily    metoprolol  100 mg Oral BID    rosuvastatin  5 mg Oral Daily    venlafaxine  37.5 mg Oral Daily    sodium chloride flush  10 mL Intravenous 2 times per day    enoxaparin  40 mg Subcutaneous BID    dexamethasone  20 mg Intravenous Q24H    remdesivir IVPB  100 mg Intravenous Q24H     PRN Meds: sodium chloride, HYDROcodone-homatropine, azelastine, benzonatate, sodium chloride flush, promethazine **OR** ondansetron, polyethylene glycol, acetaminophen **OR** acetaminophen, guaiFENesin-dextromethorphan, benzocaine-menthol, sodium chloride, albuterol sulfate HFA      Intake/Output Summary (Last 24 hours) at 3/24/2021 2339  Last data filed at 3/24/2021 2203  Gross per 24 hour   Intake 2409 ml   Output 2300 ml   Net 109 ml       Physical Exam Performed:    BP (!) 142/72   Pulse 65   Temp 98.5 °F (36.9 °C) (Oral)   Resp 18   Ht 5' 4\" (1.626 m)   Wt 206 lb 9.1 oz (93.7 kg)   SpO2 95%   BMI 35.46 kg/m²     General appearance: Ill-appearing   HEENT:  Normal cephalic, atraumatic without obvious deformity. Pupils equal, round, and reactive to light.  Extra ocular muscles intact. Conjunctivae/corneas clear. Neck: Supple, with full range of motion. No jugular venous distention. Trachea midline. Respiratory: Increased respiratory effort.  Bibasilar Rales, no accessory muscle use   Cardiovascular:  Regular rate and rhythm without murmurs, rubs or gallops.   Abdomen: Soft, non-tender, non-distended, without rebound or guarding. Normal bowel sounds. Musculoskeletal:  No clubbing, cyanosis or edema bilaterally.  Full range of motion without deformity. Skin: Skin color, texture, turgor normal.  No rashes or lesions. Neurologic:  Neurovascularly intact without any focal sensory/motor deficits. Cranial nerves: II-XII intact, grossly non-focal.  Psychiatric:  Alert and oriented, thought content appropriate, normal insight  Capillary Refill: Brisk,< 3 seconds   Peripheral Pulses: +2 palpable, equal bilaterally     Labs:   Recent Labs     03/22/21  0539 03/23/21  0538 03/24/21  0529   WBC 2.7* 7.6 10.7   HGB 11.9* 11.0* 11.8*   HCT 33.5* 30.6* 33.2*    295 340     Recent Labs     03/22/21 2017 03/23/21  0538 03/24/21  0529   * 125* 130*   K 3.6 3.8 4.0   CL 88* 92* 96*   CO2 26 23 24   BUN 10 11 12   CREATININE 0.5* <0.5* <0.5*   CALCIUM 8.5 8.9 8.9   PHOS 1.8* 2.6 2.6     Recent Labs     03/22/21  0539 03/23/21  0538 03/24/21  0529   AST 32 21 18   ALT 22 19 19   BILIDIR  --  <0.2 <0.2   BILITOT 0.6 0.4 0.3   ALKPHOS 75 69 68     No results for input(s): INR in the last 72 hours. No results for input(s): Radha Gaster in the last 72 hours. Urinalysis:      Lab Results   Component Value Date    NITRU Negative 03/22/2021    WBCUA 8 03/22/2021    BACTERIA 1+ 03/22/2021    RBCUA 2 03/22/2021    BLOODU Negative 03/22/2021    SPECGRAV 1.008 03/22/2021    GLUCOSEU Negative 03/22/2021       Radiology:  XR CHEST PORTABLE   Final Result   Multifocal bibasilar predominant airspace disease compatible with the   provided history of COVID-19 pneumonia.                  Assessment/Plan:    Active Hospital Problems    Diagnosis    Acute respiratory failure due to COVID-19 ([facility] [address] [U07.1, J96.00]    Acute respiratory failure with hypoxia ([address] [J96.01]    BMI 35.0-35.9,adult [Z68.35]    Acquired hypothyroidism [E03.9]    Essential hypertension [I10]         Covid 19 Pneumonia  - droplet plus isolation precautions  - Covid 19 swab + 3/12/2021 (Symptom onset 3/9/2021)  - Oxygen titrate to keep saturation > 90%  - Chest xray: Multifocal bibasilar predominant airspace disease compatible with COVID-19 pneumonia  - on Decadron/remdesivir  - consulted Pulmonology     Acute hypoxic respiratory failure   - due to Covid Pnuemonia  - with increased work of breath, tachypnea and hypoxia  - room air saturation 81%  - provide supplemental oxygen as necessary to keep SaO2 92% or greater.  - O2 requirement improved from 7L-->6L     Hyponatremia - improved  - na 118-->122-->125-->130  - nephrology consulted in ED    Hyperglycemia - check A1c,  ?steroid induced,on SSI     Essential (primary) hypertension   - BP on the lower end  - continue home meds - decreased losartan     Hyperlipidemia   - continue statin     Hypothyroidism  - check tsh  - continue synthroid           DVT Prophylaxis: Lovenox  Diet: DIET GENERAL; Daily Fluid Restriction: 1500 ml  Code Status: Full Code    PT/OT Eval Status: ordered    Cherylann Friday care    Edita Sweet MD

## 2021-03-26 LAB
ALBUMIN SERPL-MCNC: 3.3 G/DL (ref 3.4–5)
ALP BLD-CCNC: 65 U/L (ref 40–129)
ALT SERPL-CCNC: 36 U/L (ref 10–40)
ANION GAP SERPL CALCULATED.3IONS-SCNC: 8 MMOL/L (ref 3–16)
AST SERPL-CCNC: 28 U/L (ref 15–37)
BASOPHILS ABSOLUTE: 0 K/UL (ref 0–0.2)
BASOPHILS RELATIVE PERCENT: 0.2 %
BILIRUB SERPL-MCNC: 0.4 MG/DL (ref 0–1)
BILIRUBIN DIRECT: <0.2 MG/DL (ref 0–0.3)
BILIRUBIN, INDIRECT: ABNORMAL MG/DL (ref 0–1)
BLOOD CULTURE, ROUTINE: NORMAL
BUN BLDV-MCNC: 12 MG/DL (ref 7–20)
CALCIUM SERPL-MCNC: 9.2 MG/DL (ref 8.3–10.6)
CHLORIDE BLD-SCNC: 95 MMOL/L (ref 99–110)
CO2: 27 MMOL/L (ref 21–32)
CREAT SERPL-MCNC: <0.5 MG/DL (ref 0.6–1.1)
CULTURE, BLOOD 2: NORMAL
EOSINOPHILS ABSOLUTE: 0 K/UL (ref 0–0.6)
EOSINOPHILS RELATIVE PERCENT: 0.1 %
GFR AFRICAN AMERICAN: >60
GFR NON-AFRICAN AMERICAN: >60
GLUCOSE BLD-MCNC: 183 MG/DL (ref 70–99)
GLUCOSE BLD-MCNC: 208 MG/DL (ref 70–99)
GLUCOSE BLD-MCNC: 219 MG/DL (ref 70–99)
GLUCOSE BLD-MCNC: 225 MG/DL (ref 70–99)
GLUCOSE BLD-MCNC: 285 MG/DL (ref 70–99)
HCT VFR BLD CALC: 37 % (ref 36–48)
HEMOGLOBIN: 13 G/DL (ref 12–16)
LYMPHOCYTES ABSOLUTE: 0.5 K/UL (ref 1–5.1)
LYMPHOCYTES RELATIVE PERCENT: 6.5 %
MCH RBC QN AUTO: 28.6 PG (ref 26–34)
MCHC RBC AUTO-ENTMCNC: 35 G/DL (ref 31–36)
MCV RBC AUTO: 81.7 FL (ref 80–100)
MONOCYTES ABSOLUTE: 0.5 K/UL (ref 0–1.3)
MONOCYTES RELATIVE PERCENT: 6.4 %
NEUTROPHILS ABSOLUTE: 6.9 K/UL (ref 1.7–7.7)
NEUTROPHILS RELATIVE PERCENT: 86.8 %
PDW BLD-RTO: 13.1 % (ref 12.4–15.4)
PERFORMED ON: ABNORMAL
PHOSPHORUS: 3.2 MG/DL (ref 2.5–4.9)
PLATELET # BLD: 349 K/UL (ref 135–450)
PMV BLD AUTO: 6.5 FL (ref 5–10.5)
POTASSIUM SERPL-SCNC: 5 MMOL/L (ref 3.5–5.1)
RBC # BLD: 4.53 M/UL (ref 4–5.2)
SODIUM BLD-SCNC: 130 MMOL/L (ref 136–145)
TOTAL PROTEIN: 5.9 G/DL (ref 6.4–8.2)
WBC # BLD: 8 K/UL (ref 4–11)

## 2021-03-26 PROCEDURE — 99232 SBSQ HOSP IP/OBS MODERATE 35: CPT | Performed by: INTERNAL MEDICINE

## 2021-03-26 PROCEDURE — 2580000003 HC RX 258: Performed by: NURSE PRACTITIONER

## 2021-03-26 PROCEDURE — 6360000002 HC RX W HCPCS: Performed by: NURSE PRACTITIONER

## 2021-03-26 PROCEDURE — 2500000003 HC RX 250 WO HCPCS: Performed by: INTERNAL MEDICINE

## 2021-03-26 PROCEDURE — 94150 VITAL CAPACITY TEST: CPT

## 2021-03-26 PROCEDURE — 6370000000 HC RX 637 (ALT 250 FOR IP): Performed by: NURSE PRACTITIONER

## 2021-03-26 PROCEDURE — 80076 HEPATIC FUNCTION PANEL: CPT

## 2021-03-26 PROCEDURE — 80069 RENAL FUNCTION PANEL: CPT

## 2021-03-26 PROCEDURE — 36415 COLL VENOUS BLD VENIPUNCTURE: CPT

## 2021-03-26 PROCEDURE — 85025 COMPLETE CBC W/AUTO DIFF WBC: CPT

## 2021-03-26 PROCEDURE — 2580000003 HC RX 258: Performed by: INTERNAL MEDICINE

## 2021-03-26 PROCEDURE — 97161 PT EVAL LOW COMPLEX 20 MIN: CPT

## 2021-03-26 PROCEDURE — 6370000000 HC RX 637 (ALT 250 FOR IP): Performed by: INTERNAL MEDICINE

## 2021-03-26 PROCEDURE — 2060000000 HC ICU INTERMEDIATE R&B

## 2021-03-26 PROCEDURE — 6360000002 HC RX W HCPCS: Performed by: INTERNAL MEDICINE

## 2021-03-26 PROCEDURE — 97530 THERAPEUTIC ACTIVITIES: CPT

## 2021-03-26 RX ADMIN — SODIUM CHLORIDE, PRESERVATIVE FREE 10 ML: 5 INJECTION INTRAVENOUS at 10:37

## 2021-03-26 RX ADMIN — AMITRIPTYLINE HYDROCHLORIDE 25 MG: 25 TABLET, FILM COATED ORAL at 20:25

## 2021-03-26 RX ADMIN — INSULIN LISPRO 9 UNITS: 100 INJECTION, SOLUTION INTRAVENOUS; SUBCUTANEOUS at 17:31

## 2021-03-26 RX ADMIN — Medication 1 G: at 12:13

## 2021-03-26 RX ADMIN — LEVOTHYROXINE SODIUM 50 MCG: 0.05 TABLET ORAL at 06:09

## 2021-03-26 RX ADMIN — Medication 1 G: at 17:31

## 2021-03-26 RX ADMIN — VENLAFAXINE HYDROCHLORIDE 37.5 MG: 37.5 CAPSULE, EXTENDED RELEASE ORAL at 08:35

## 2021-03-26 RX ADMIN — METOPROLOL TARTRATE 100 MG: 50 TABLET, FILM COATED ORAL at 20:25

## 2021-03-26 RX ADMIN — BENZOCAINE AND MENTHOL 1 LOZENGE: 15; 3.6 LOZENGE ORAL at 17:31

## 2021-03-26 RX ADMIN — INSULIN LISPRO 2 UNITS: 100 INJECTION, SOLUTION INTRAVENOUS; SUBCUTANEOUS at 20:58

## 2021-03-26 RX ADMIN — Medication 1 G: at 08:36

## 2021-03-26 RX ADMIN — ENOXAPARIN SODIUM 40 MG: 40 INJECTION SUBCUTANEOUS at 20:26

## 2021-03-26 RX ADMIN — INSULIN LISPRO 6 UNITS: 100 INJECTION, SOLUTION INTRAVENOUS; SUBCUTANEOUS at 08:36

## 2021-03-26 RX ADMIN — INSULIN LISPRO 6 UNITS: 100 INJECTION, SOLUTION INTRAVENOUS; SUBCUTANEOUS at 12:13

## 2021-03-26 RX ADMIN — ROSUVASTATIN CALCIUM 5 MG: 10 TABLET, FILM COATED ORAL at 08:35

## 2021-03-26 RX ADMIN — LOSARTAN POTASSIUM 50 MG: 50 TABLET, FILM COATED ORAL at 08:36

## 2021-03-26 RX ADMIN — BENZOCAINE AND MENTHOL 1 LOZENGE: 15; 3.6 LOZENGE ORAL at 20:37

## 2021-03-26 RX ADMIN — BENZOCAINE AND MENTHOL 1 LOZENGE: 15; 3.6 LOZENGE ORAL at 13:27

## 2021-03-26 RX ADMIN — GUAIFENESIN SYRUP AND DEXTROMETHORPHAN 5 ML: 100; 10 SYRUP ORAL at 20:37

## 2021-03-26 RX ADMIN — GUAIFENESIN SYRUP AND DEXTROMETHORPHAN 5 ML: 100; 10 SYRUP ORAL at 13:27

## 2021-03-26 RX ADMIN — INSULIN GLARGINE 15 UNITS: 100 INJECTION, SOLUTION SUBCUTANEOUS at 20:58

## 2021-03-26 RX ADMIN — DEXAMETHASONE SODIUM PHOSPHATE 20 MG: 4 INJECTION, SOLUTION INTRA-ARTICULAR; INTRALESIONAL; INTRAMUSCULAR; INTRAVENOUS; SOFT TISSUE at 20:25

## 2021-03-26 RX ADMIN — REMDESIVIR 100 MG: 100 INJECTION, POWDER, LYOPHILIZED, FOR SOLUTION INTRAVENOUS at 12:13

## 2021-03-26 RX ADMIN — SODIUM CHLORIDE, PRESERVATIVE FREE 10 ML: 5 INJECTION INTRAVENOUS at 20:25

## 2021-03-26 RX ADMIN — ENOXAPARIN SODIUM 40 MG: 40 INJECTION SUBCUTANEOUS at 08:35

## 2021-03-26 RX ADMIN — AMLODIPINE BESYLATE 10 MG: 10 TABLET ORAL at 08:36

## 2021-03-26 RX ADMIN — METOPROLOL TARTRATE 100 MG: 50 TABLET, FILM COATED ORAL at 08:36

## 2021-03-26 ASSESSMENT — PAIN SCALES - GENERAL
PAINLEVEL_OUTOF10: 0
PAINLEVEL_OUTOF10: 0

## 2021-03-26 NOTE — CARE COORDINATION
Case management follow up. Unable to reach patient in room. Per chart review patient down to 1L O2. Pulmonology signed off. Plan is home independently. Monitor for home O2 needs.    Electronically signed by Carlos Brown RN Case Management [phone] on 3/26/2021 at 1:20 PM

## 2021-03-26 NOTE — PLAN OF CARE
Problem: Airway Clearance - Ineffective  Goal: Achieve or maintain patent airway  3/26/2021 0113 by Bambi Kat RN  Outcome: Ongoing     Problem: Gas Exchange - Impaired  Goal: Absence of hypoxia  3/26/2021 0113 by Bambi Kat RN  Outcome: Ongoing     Problem: Gas Exchange - Impaired  Goal: Promote optimal lung function  3/26/2021 0113 by Bambi Kat RN  Outcome: Ongoing     Problem: Breathing Pattern - Ineffective  Goal: Ability to achieve and maintain a regular respiratory rate  3/26/2021 0113 by Bambi Kat RN  Outcome: Ongoing     Problem: Body Temperature -  Risk of, Imbalanced  Goal: Ability to maintain a body temperature within defined limits  3/26/2021 0113 by Bambi Kat RN  Outcome: Ongoing     Problem: Body Temperature -  Risk of, Imbalanced  Goal: Will regain or maintain usual level of consciousness  3/26/2021 0113 by Bambi Kat RN  Outcome: Ongoing     Problem:  Body Temperature -  Risk of, Imbalanced  Goal: Complications related to the disease process, condition or treatment will be avoided or minimized  3/26/2021 0113 by Bambi Kat RN  Outcome: Ongoing     Problem: Isolation Precautions - Risk of Spread of Infection  Goal: Prevent transmission of infection  3/26/2021 0113 by Bambi Kat RN  Outcome: Ongoing     Problem: Nutrition Deficits  Goal: Optimize nutritional status  3/26/2021 0113 by Bambi Kat RN  Outcome: Ongoing     Problem: Risk for Fluid Volume Deficit  Goal: Maintain normal heart rhythm  3/26/2021 0113 by Bambi Kat RN  Outcome: Ongoing     Problem: Risk for Fluid Volume Deficit  Goal: Maintain absence of muscle cramping  3/26/2021 0113 by Bambi Kat RN  Outcome: Ongoing     Problem: Risk for Fluid Volume Deficit  Goal: Maintain normal serum potassium, sodium, calcium, phosphorus, and pH  3/26/2021 0113 by Bambi Kat RN  Outcome: Ongoing     Problem: Loneliness or Risk for Loneliness  Goal: Demonstrate positive use of time alone when socialization is not possible  3/26/2021 0113 by Madelin Verdugo RN  Outcome: Ongoing     Problem: Fatigue  Goal: Verbalize increase energy and improved vitality  3/26/2021 0113 by Madelin Verdugo RN  Outcome: Ongoing     Problem: Patient Education: Go to Patient Education Activity  Goal: Patient/Family Education  3/26/2021 0113 by Madelin Verdugo RN  Outcome: Ongoing

## 2021-03-26 NOTE — PROGRESS NOTES
Acute respiratory failure with hypoxia ([facility]) [J96.01]    BMI 35.0-35.9,adult [Z68.35]    Acquired hypothyroidism [E03.9]    Essential hypertension [I10]         Covid 19 Pneumonia  - droplet plus isolation precautions  - Covid 19 swab + 3/12/2021 (Symptom onset 3/9/2021)  - Oxygen titrate to keep saturation > 90%  - Chest xray: Multifocal bibasilar predominant airspace disease compatible with COVID-19 pneumonia  - on Decadron/remdesivir  - consulted Pulmonology     Acute hypoxic respiratory failure   - due to Covid Pnuemonia  - with increased work of breath, tachypnea and hypoxia  - room air saturation 81%  - provide supplemental oxygen as necessary to keep SaO2 92% or greater.  - O2 requirement improved from 7L-->6-->5-->1L  - added IS     Hyponatremia - improved  - na 118-->122-->125-->130, dropped again to 126 yesterday-->130 today  - nephrology consulted in ED     Hyperglycemia/PreDM II - check A1c 6.4,  ?steroid induced, on SSI, add lantus/SSI.  Switched diet to carb diet     Essential (primary) hypertension   - BP on the lower end  - continue home meds - decreased losartan     Hyperlipidemia   - continue statin     Hypothyroidism  - check tsh  - continue synthroid           DVT Prophylaxis: Lovenox  Diet: DIET CARB CONTROL; Daily Fluid Restriction: 1500 ml  Code Status: Full Code    PT/OT Eval Status: ordered    Dispo - poss dc in am if off Jaylen MD Omkar

## 2021-03-26 NOTE — PROGRESS NOTES
[facility] Inpatient Nephrology Note        SUBJECTIVE:    Reason: Hyponatremia  HPI: serum sodium improved. O2 requirements less. Soc Hx: no family present  ROS: BP usually controlled. Weight stable. Medications     sodium chloride  1 g Oral TID     [START ON 3/28/2021] dexamethasone  10 mg Oral Daily    insulin lispro  0-18 Units Subcutaneous TID     insulin lispro  0-9 Units Subcutaneous Nightly    insulin glargine  15 Units Subcutaneous Nightly    amitriptyline  25 mg Oral Nightly    [Held by provider] potassium chloride  20 mEq Oral BID     losartan  50 mg Oral Daily    amLODIPine  10 mg Oral Daily    levothyroxine  50 mcg Oral Daily    metoprolol  100 mg Oral BID    rosuvastatin  5 mg Oral Daily    venlafaxine  37.5 mg Oral Daily    sodium chloride flush  10 mL Intravenous 2 times per day    enoxaparin  40 mg Subcutaneous BID    dexamethasone  20 mg Intravenous Q24H    remdesivir IVPB  100 mg Intravenous Q24H         OBJECTIVE      Physical    TEMPERATURE:  Current - Temp: 97.5 °F (36.4 °C); Max - Temp  Av.8 °F (36.6 °C)  Min: 97.3 °F (36.3 °C)  Max: 98.3 °F (36.8 °C)  RESPIRATIONS RANGE: Resp  Av.4  Min: 16  Max: 18  PULSE RANGE: Pulse  Av.2  Min: 54  Max: 70  BLOOD PRESSURE RANGE:  Systolic (05IRC), HLV:811 , Min:125 , ZBC:157   ; Diastolic (49SKM), SRB:35, Min:69, Max:78    PULSE OXIMETRY RANGE: SpO2  Av.2 %  Min: 91 %  Max: 98 %  24HR INTAKE/OUTPUT:      Intake/Output Summary (Last 24 hours) at 3/26/2021 0734  Last data filed at 3/26/2021 0420  Gross per 24 hour   Intake 1740 ml   Output 1000 ml   Net 740 ml       GEN: no distress. obese  HEENT: no icterus  NECK: Trachea midline  CV: RRR  LUNGS: diminished bilaterally, unlabored  ABD: + bowel sounds. No distension. No  tenderness  EXT: no edema.    SKIN: no rash  NEURO: no tremor    Data      Lab Results   Component Value Date    CREATININE <0.5 (L) 2021 BUN 12 03/26/2021     (L) 03/26/2021    K 5.0 03/26/2021    CL 95 (L) 03/26/2021    CO2 27 03/26/2021     Lab Results   Component Value Date    WBC 8.0 03/26/2021    HGB 13.0 03/26/2021    HCT 37.0 03/26/2021    MCV 81.7 03/26/2021     03/26/2021       ASSESSMENT     Patient Active Problem List   Diagnosis    BMI 35.0-35.9,adult    Hypertriglyceridemia    Lipoma of torso    Hot flashes    Hyperglycemia    Acquired hypothyroidism    Essential hypertension    Allergic rhinitis    Status post hysterectomy    headache    Lipoma of right lower extremity    Gastroesophageal reflux disease without esophagitis    Acute respiratory failure due to COVID-19 [facility])    Acute respiratory failure with hypoxia (HCC)       ASSESSMENT/PLAN:    # Hyponatremia  - asymptomatic   - low TSH noted   - normal serum cortisol  - baseline serum sodium 137 in 2/2020  - serum osm 268  - 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   - Uric acid 3.2   - urine sodium < 20, UK is 12   - Uosm inappropriately concentrated at 233  - component of volume depletion noted  - serum sodium improved  - continue 1.5L fluid restriction for now  - continue to hold HCTZ  - continue NaCL tabs 1 gm TID  - plan to DC NaCL tabs if serum sodium rises further  - expect that fluid restriction can be DC'd once pulmonary process resolved  - monitor daily labs    # COVID Pneumonia  - on less O2  - pulmonary following  - on remdesivir and dexamethasone    # Hypokalemia  - resolved with repletion  - continue to hold daily KCL dose while off HCTZ    # HTN  - BP adequate  - monitor off HCTZ      Available via [employer]les[employer] secure messaging

## 2021-03-26 NOTE — PROGRESS NOTES
Pulmonary Progress Note    CC:  Follow up hypoxia, COVID    Subjective: On 3 liters of oxygen  Still coughing  Better. Proning           Intake/Output Summary (Last 24 hours) at 3/26/2021 0858  Last data filed at 3/26/2021 0420  Gross per 24 hour   Intake 1380 ml   Output 1000 ml   Net 380 ml         PHYSICAL EXAM:  Blood pressure 137/81, pulse 60, temperature 97.4 °F (36.3 °C), temperature source Oral, resp. rate 16, height 5' 4\" (1.626 m), weight 205 lb 0.4 oz (93 kg), SpO2 97 %, not currently breastfeeding.'  Gen: No distress. Eyes: PERRL. No sclera icterus. No conjunctival injection. ENT: No discharge. Pharynx clear. External appearance of ears and nose normal.  Neck: Trachea midline. No obvious mass. Resp: Crackles  CV: Regular rate. Regular rhythm. No murmur or rub. GI: Non-tender. Non-distended. No hernia. Skin: Warm, dry, normal texture and turgor. No nodule on exposed extremities. Lymph: No cervical LAD. No supraclavicular LAD. M/S: No cyanosis. No clubbing. No joint deformity. Neuro: Moves all four extremities. CN 2-12 tested, no defect noted.   Ext:   no edema    Medications:    Scheduled Meds:   sodium chloride  1 g Oral TID     [START ON 3/28/2021] dexamethasone  10 mg Oral Daily    insulin lispro  0-18 Units Subcutaneous TID     insulin lispro  0-9 Units Subcutaneous Nightly    insulin glargine  15 Units Subcutaneous Nightly    amitriptyline  25 mg Oral Nightly    [Held by provider] potassium chloride  20 mEq Oral BID     losartan  50 mg Oral Daily    amLODIPine  10 mg Oral Daily    levothyroxine  50 mcg Oral Daily    metoprolol  100 mg Oral BID    rosuvastatin  5 mg Oral Daily    venlafaxine  37.5 mg Oral Daily    sodium chloride flush  10 mL Intravenous 2 times per day    enoxaparin  40 mg Subcutaneous BID    dexamethasone  20 mg Intravenous Q24H    remdesivir IVPB  100 mg Intravenous Q24H       Continuous Infusions:   dextrose         PRN Meds:  sodium chloride, HYDROcodone-homatropine, glucose, dextrose, glucagon (rDNA), dextrose, azelastine, benzonatate, sodium chloride flush, promethazine **OR** ondansetron, polyethylene glycol, acetaminophen **OR** acetaminophen, guaiFENesin-dextromethorphan, benzocaine-menthol, sodium chloride, albuterol sulfate HFA    Labs:  CBC:   Recent Labs     03/24/21  0529 03/25/21  0545 03/26/21  0538   WBC 10.7 7.8 8.0   HGB 11.8* 11.3* 13.0   HCT 33.2* 32.4* 37.0   MCV 81.6 81.8 81.7    342 349     BMP:   Recent Labs     03/24/21 0529 03/25/21  0545 03/26/21  0538   * 126* 130*   K 4.0 4.8 5.0   CL 96* 94* 95*   CO2 24 25 27   PHOS 2.6 3.0 3.2   BUN 12 12 12   CREATININE <0.5* <0.5* <0.5*     LIVER PROFILE:   Recent Labs     03/24/21 0529 03/25/21  0545 03/26/21  0538   AST 18 20 28   ALT 19 23 36   BILIDIR <0.2 <0.2 <0.2   BILITOT 0.3 0.4 0.4   ALKPHOS 68 62 65     PT/INR: No results for input(s): PROTIME, INR in the last 72 hours. APTT: No results for input(s): APTT in the last 72 hours. UA:  No results for input(s): NITRITE, COLORU, PHUR, LABCAST, WBCUA, RBCUA, MUCUS, TRICHOMONAS, YEAST, BACTERIA, CLARITYU, SPECGRAV, LEUKOCYTESUR, UROBILINOGEN, BILIRUBINUR, BLOODU, GLUCOSEU, AMORPHOUS in the last 72 hours. Invalid input(s): Kiesha Donnelly  No results for input(s): PH, PCO2, PO2 in the last 72 hours. ECHO: 2017  Normal left ventricle size, wall thickness and systolic function with an   estimated ejection fraction of 60-65%.   No regional wall motion abnormalities are seen.   The right ventricle is normal in size and function.     ABG:  None     Chest X-ray:  Chest imaging was reviewed by me and showed bilateral airspace disase     I reviewed all the above labs and studies pertaining to this visit.        ASSESSMENT/PLAN:  · Acute Hypoxic Respiratory Failure with saturations less than 90% on room air, secondary to COVID-19 Pneumonia  · Titrate oxygen for saturations greater than or equal to 88%  ?  Decadron 20

## 2021-03-26 NOTE — PROGRESS NOTES
Physical Therapy    Facility/Department: [address] PROGRESSIVE CARE  Initial Assessment    NAME: Aria Travis  : 1960  MRN: [id number]    Date of Service: 3/26/2021    Discharge Recommendations:  Home with assist PRN        Assessment   Assessment: 64 y.o. female with PMHx of HTN, HLD and hypothyroidism presented to [facility] on 3/22/21 with cough and shortness of breath. Patient was diagnosed with Covid on 3/12/2021. She states her symptoms began on 3/9 initially with loss of taste and decreased appetite. She then developed cough with shortness of breath. Shortness of breath is worsened with any exertion. She complains of fatigue and myalgias. Pt currently functioning near her baseline, with the exception of her respiratory status. Pt was able to take 1L O2 off and generally sustain >88%, jumping to mid 90's with use of incentive spirometer. Nursing aware that pt was left off 1L O2 to trial RA. Recommend pt return home with PRN assist from spouse. No further skilled PT recommended. Prognosis: Excellent  Decision Making: Low Complexity  History: see below  Exam: see below  Clinical Presentation: stable  PT Education: PT Role;Plan of Care;General Safety  No Skilled PT: Independent with functional mobility   REQUIRES PT FOLLOW UP: No  Activity Tolerance  Activity Tolerance: Patient Tolerated treatment well       Patient Diagnosis(es): The primary encounter diagnosis was Acute respiratory failure due to COVID-19 [facility]). Diagnoses of Hypoxia, Hypokalemia, and Hyponatremia were also pertinent to this visit. has a past medical history of Anxiety, GERD (gastroesophageal reflux disease), Hypertension, Hypothyroidism, and Polyp of colon. has a past surgical history that includes Hysterectomy (); Colonoscopy; and Colonoscopy (2018).     Restrictions  Restrictions/Precautions  Restrictions/Precautions: Up Ad Mae, Contact Precautions, Isolation  Position Activity Restriction  Other position/activity restrictions: COVID+; droplet plus     Vision/Hearing  Vision: Within Functional Limits  Hearing: Within functional limits       Subjective  General  Chart Reviewed: Yes  Patient assessed for rehabilitation services?: Yes  Additional Pertinent Hx: 64 y.o. female with PMHx of HTN, HLD and hypothyroidism presented to [facility] on 3/22/21 with cough and shortness of breath. Patient was diagnosed with Covid on 3/12/2021. She states her symptoms began on 3/9 initially with loss of taste and decreased appetite. She then developed cough with shortness of breath. Shortness of breath is worsened with any exertion. She complains of fatigue and myalgias. Response To Previous Treatment: Not applicable  Family / Caregiver Present: No  Referring Practitioner: Kiara Santamaria MD  Referral Date : 03/26/21  Diagnosis: COVID  Follows Commands: Within Functional Limits  Subjective  Subjective: Pt is agreeable to PT  Pain Screening  Patient Currently in Pain: Denies          Orientation  Orientation  Overall Orientation Status: Within Functional Limits     Social/Functional History  Social/Functional History  Lives With: Spouse  Type of Home: House  Home Layout: Two level, 1/2 bath on main level, Bed/Bath upstairs  Home Access: Stairs to enter without rails  Entrance Stairs - Number of Steps: 2  Bathroom Shower/Tub: Tub/Shower unit  Bathroom Toilet: Standard  ADL Assistance: Independent  Homemaking Assistance: Independent  Ambulation Assistance: Independent  Transfer Assistance: Independent  Active : Yes  Mode of Transportation: Car  Occupation: Unemployed     Cognition   Cognition  Overall Cognitive Status: WFL    Objective  Strength RLE  Strength RLE: WFL  Strength LLE  Strength LLE: WFL  Motor Control  Gross Motor?: WFL     Bed mobility  Supine to Sit: Independent  Sit to Supine: Independent  Transfers  Sit to Stand: Independent  Stand to sit:  Independent  Ambulation  Ambulation?: Yes  Ambulation 1  Surface: level olga  Device: No Device  Assistance: Independent  Gait Deviations: Slow Lela  Distance: 100'  Comments: O2 on RA at 90% while sitting on EOB - drops to 87% with ambulation of 100', however quickly recovers with several deep breaths. Pt able to bring her O2 sats up to 95% with use of incentive spirometer.   Stairs/Curb  Stairs?: No     Balance  Posture: Good  Sitting - Static: Good  Sitting - Dynamic: Good  Standing - Static: Good  Standing - Dynamic: Good        Plan   Safety Devices  Type of devices: Call light within reach, Left in bed, Nurse notified      AM-PAC Score  AM-PAC Inpatient Mobility Raw Score : 24 (03/26/21 1450)  AM-PAC Inpatient T-Scale Score : 61.14 (03/26/21 1450)  Mobility Inpatient CMS 0-100% Score: 0 (03/26/21 1450)  Mobility Inpatient CMS G-Code Modifier : 509 [address] (03/26/21 1450)        Therapy Time   Individual Concurrent Group Co-treatment   Time In 1410         Time Out 1440         Minutes 30         Timed Code Treatment Minutes: 15 Minutes       Martie Kocher, PT

## 2021-03-27 LAB
ALBUMIN SERPL-MCNC: 3.1 G/DL (ref 3.4–5)
ALP BLD-CCNC: 64 U/L (ref 40–129)
ALT SERPL-CCNC: 34 U/L (ref 10–40)
ANION GAP SERPL CALCULATED.3IONS-SCNC: 9 MMOL/L (ref 3–16)
AST SERPL-CCNC: 19 U/L (ref 15–37)
BANDED NEUTROPHILS RELATIVE PERCENT: 1 % (ref 0–7)
BASOPHILS ABSOLUTE: 0 K/UL (ref 0–0.2)
BASOPHILS RELATIVE PERCENT: 0 %
BILIRUB SERPL-MCNC: 0.5 MG/DL (ref 0–1)
BILIRUBIN DIRECT: <0.2 MG/DL (ref 0–0.3)
BILIRUBIN, INDIRECT: ABNORMAL MG/DL (ref 0–1)
BUN BLDV-MCNC: 15 MG/DL (ref 7–20)
CALCIUM SERPL-MCNC: 8.8 MG/DL (ref 8.3–10.6)
CHLORIDE BLD-SCNC: 93 MMOL/L (ref 99–110)
CO2: 26 MMOL/L (ref 21–32)
CREAT SERPL-MCNC: <0.5 MG/DL (ref 0.6–1.1)
EOSINOPHILS ABSOLUTE: 0 K/UL (ref 0–0.6)
EOSINOPHILS RELATIVE PERCENT: 0 %
GFR AFRICAN AMERICAN: >60
GFR NON-AFRICAN AMERICAN: >60
GLUCOSE BLD-MCNC: 133 MG/DL (ref 70–99)
GLUCOSE BLD-MCNC: 235 MG/DL (ref 70–99)
GLUCOSE BLD-MCNC: 240 MG/DL (ref 70–99)
GLUCOSE BLD-MCNC: 250 MG/DL (ref 70–99)
GLUCOSE BLD-MCNC: 289 MG/DL (ref 70–99)
HCT VFR BLD CALC: 37.3 % (ref 36–48)
HEMOGLOBIN: 13.1 G/DL (ref 12–16)
LYMPHOCYTES ABSOLUTE: 0.7 K/UL (ref 1–5.1)
LYMPHOCYTES RELATIVE PERCENT: 9 %
MCH RBC QN AUTO: 28.9 PG (ref 26–34)
MCHC RBC AUTO-ENTMCNC: 35.1 G/DL (ref 31–36)
MCV RBC AUTO: 82.4 FL (ref 80–100)
MONOCYTES ABSOLUTE: 0.2 K/UL (ref 0–1.3)
MONOCYTES RELATIVE PERCENT: 3 %
MYELOCYTE PERCENT: 3 %
NEUTROPHILS ABSOLUTE: 6.4 K/UL (ref 1.7–7.7)
NEUTROPHILS RELATIVE PERCENT: 84 %
PDW BLD-RTO: 13.2 % (ref 12.4–15.4)
PERFORMED ON: ABNORMAL
PHOSPHORUS: 3.3 MG/DL (ref 2.5–4.9)
PLATELET # BLD: 358 K/UL (ref 135–450)
PMV BLD AUTO: 6.4 FL (ref 5–10.5)
POTASSIUM SERPL-SCNC: 4.5 MMOL/L (ref 3.5–5.1)
RBC # BLD: 4.53 M/UL (ref 4–5.2)
SLIDE REVIEW: ABNORMAL
SODIUM BLD-SCNC: 128 MMOL/L (ref 136–145)
TOTAL PROTEIN: 6 G/DL (ref 6.4–8.2)
WBC # BLD: 7.3 K/UL (ref 4–11)

## 2021-03-27 PROCEDURE — 80076 HEPATIC FUNCTION PANEL: CPT

## 2021-03-27 PROCEDURE — 36415 COLL VENOUS BLD VENIPUNCTURE: CPT

## 2021-03-27 PROCEDURE — 6370000000 HC RX 637 (ALT 250 FOR IP): Performed by: NURSE PRACTITIONER

## 2021-03-27 PROCEDURE — 6370000000 HC RX 637 (ALT 250 FOR IP): Performed by: INTERNAL MEDICINE

## 2021-03-27 PROCEDURE — 6360000002 HC RX W HCPCS: Performed by: NURSE PRACTITIONER

## 2021-03-27 PROCEDURE — 94761 N-INVAS EAR/PLS OXIMETRY MLT: CPT

## 2021-03-27 PROCEDURE — 2580000003 HC RX 258: Performed by: NURSE PRACTITIONER

## 2021-03-27 PROCEDURE — 2060000000 HC ICU INTERMEDIATE R&B

## 2021-03-27 PROCEDURE — 85025 COMPLETE CBC W/AUTO DIFF WBC: CPT

## 2021-03-27 PROCEDURE — 80069 RENAL FUNCTION PANEL: CPT

## 2021-03-27 RX ADMIN — Medication 1 G: at 08:03

## 2021-03-27 RX ADMIN — INSULIN LISPRO 6 UNITS: 100 INJECTION, SOLUTION INTRAVENOUS; SUBCUTANEOUS at 08:04

## 2021-03-27 RX ADMIN — VENLAFAXINE HYDROCHLORIDE 37.5 MG: 37.5 CAPSULE, EXTENDED RELEASE ORAL at 10:23

## 2021-03-27 RX ADMIN — LOSARTAN POTASSIUM 50 MG: 50 TABLET, FILM COATED ORAL at 08:04

## 2021-03-27 RX ADMIN — Medication 1 G: at 12:07

## 2021-03-27 RX ADMIN — METOPROLOL TARTRATE 100 MG: 50 TABLET, FILM COATED ORAL at 08:03

## 2021-03-27 RX ADMIN — BENZOCAINE AND MENTHOL 1 LOZENGE: 15; 3.6 LOZENGE ORAL at 21:38

## 2021-03-27 RX ADMIN — AMITRIPTYLINE HYDROCHLORIDE 25 MG: 25 TABLET, FILM COATED ORAL at 21:38

## 2021-03-27 RX ADMIN — ENOXAPARIN SODIUM 40 MG: 40 INJECTION SUBCUTANEOUS at 08:03

## 2021-03-27 RX ADMIN — METOPROLOL TARTRATE 100 MG: 50 TABLET, FILM COATED ORAL at 21:38

## 2021-03-27 RX ADMIN — BENZOCAINE AND MENTHOL 1 LOZENGE: 15; 3.6 LOZENGE ORAL at 05:54

## 2021-03-27 RX ADMIN — ENOXAPARIN SODIUM 40 MG: 40 INJECTION SUBCUTANEOUS at 21:38

## 2021-03-27 RX ADMIN — INSULIN LISPRO 9 UNITS: 100 INJECTION, SOLUTION INTRAVENOUS; SUBCUTANEOUS at 17:40

## 2021-03-27 RX ADMIN — INSULIN GLARGINE 15 UNITS: 100 INJECTION, SOLUTION SUBCUTANEOUS at 21:37

## 2021-03-27 RX ADMIN — SALINE NASAL SPRAY 1 SPRAY: 1.5 SOLUTION NASAL at 21:37

## 2021-03-27 RX ADMIN — Medication 10 ML: at 21:37

## 2021-03-27 RX ADMIN — Medication 15 G: at 17:40

## 2021-03-27 RX ADMIN — LEVOTHYROXINE SODIUM 50 MCG: 0.05 TABLET ORAL at 06:28

## 2021-03-27 RX ADMIN — ROSUVASTATIN CALCIUM 5 MG: 10 TABLET, FILM COATED ORAL at 08:03

## 2021-03-27 RX ADMIN — AMLODIPINE BESYLATE 10 MG: 10 TABLET ORAL at 08:03

## 2021-03-27 RX ADMIN — GUAIFENESIN SYRUP AND DEXTROMETHORPHAN 5 ML: 100; 10 SYRUP ORAL at 21:38

## 2021-03-27 RX ADMIN — BENZONATATE 200 MG: 100 CAPSULE ORAL at 21:38

## 2021-03-27 RX ADMIN — INSULIN LISPRO 9 UNITS: 100 INJECTION, SOLUTION INTRAVENOUS; SUBCUTANEOUS at 12:07

## 2021-03-27 ASSESSMENT — PAIN SCALES - GENERAL
PAINLEVEL_OUTOF10: 0
PAINLEVEL_OUTOF10: 0

## 2021-03-27 NOTE — PROGRESS NOTES
@[id number](VitD,PTH,TSH,aldosterone,renin activity,cortisol,metanephrine)@    CBC:   Recent Labs     03/25/21  0545 03/26/21  0538 03/27/21  0543   WBC 7.8 8.0 7.3   HGB 11.3* 13.0 13.1   HCT 32.4* 37.0 37.3   MCV 81.8 81.7 82.4    349 358       Iron Panel:   @[id number](FERA,FE)@    Serology: @[id number](ANAS,ANCA,C3,C4,RNP,AGBM,DNA,SSA,SSB,SPEP,UPEP,ESR,HEPT)@    Urine studies: @[id number](UAPR,UCOL,UNAR,UUNR,UCRR,UCLR,UKR,UUAR,UOSM,EOS)@        ASSESSMENT and PLAN:     1. Hyponatremia (baseline Na 137): asymptomatic, low TSH noted, normal serum cortisol, likely component of hypovolemic hyponatremia +/- HCTZ. SIADH was raised as a concern, however, John is <20, hence SIADH is unlikely. SNa is still drifting down despite despite NaCl tabs. - D/C Na tabs, encourage no Na restriction with diet    - Continue FR and decrease to 1200mL/d   - Start [place] at 15g/d   - Will need f/u with Nephrology upon discharge (Dr Lombardi Argyle)   - Avoid any further use of HCTZ    2. COVID Pneumonia: on less O2, pulmonary following, on remdesivir and dexamethasone     3. Hypokalemia: resolved with repletion     4. HTN: BP adequate with amlodipine.     5. Dispo: hopefully d/c on 3/28        Signed By: Melony Cool MD

## 2021-03-27 NOTE — PROGRESS NOTES
Occupational Therapy  No Eval/Discharge    Melissa Husain  3/27/2021      OT orders received. Spoke with RN who reports pt UAL in room managing ADLs independently and does not have any therapy needs. No acute OT services indicated, will sign off.     Justino Rogers, OTR/L [id number]

## 2021-03-28 VITALS
DIASTOLIC BLOOD PRESSURE: 77 MMHG | HEART RATE: 68 BPM | RESPIRATION RATE: 16 BRPM | WEIGHT: 200.4 LBS | BODY MASS INDEX: 34.21 KG/M2 | SYSTOLIC BLOOD PRESSURE: 139 MMHG | HEIGHT: 64 IN | OXYGEN SATURATION: 93 % | TEMPERATURE: 98.2 F

## 2021-03-28 LAB
ALBUMIN SERPL-MCNC: 3 G/DL (ref 3.4–5)
ALP BLD-CCNC: 58 U/L (ref 40–129)
ALT SERPL-CCNC: 28 U/L (ref 10–40)
ANION GAP SERPL CALCULATED.3IONS-SCNC: 7 MMOL/L (ref 3–16)
AST SERPL-CCNC: 14 U/L (ref 15–37)
BASOPHILS ABSOLUTE: 0 K/UL (ref 0–0.2)
BASOPHILS RELATIVE PERCENT: 0.3 %
BILIRUB SERPL-MCNC: 0.4 MG/DL (ref 0–1)
BILIRUBIN DIRECT: <0.2 MG/DL (ref 0–0.3)
BILIRUBIN, INDIRECT: ABNORMAL MG/DL (ref 0–1)
BUN BLDV-MCNC: 22 MG/DL (ref 7–20)
CALCIUM SERPL-MCNC: 8.8 MG/DL (ref 8.3–10.6)
CHLORIDE BLD-SCNC: 97 MMOL/L (ref 99–110)
CO2: 26 MMOL/L (ref 21–32)
CREAT SERPL-MCNC: 0.6 MG/DL (ref 0.6–1.1)
EOSINOPHILS ABSOLUTE: 0 K/UL (ref 0–0.6)
EOSINOPHILS RELATIVE PERCENT: 0.1 %
GFR AFRICAN AMERICAN: >60
GFR NON-AFRICAN AMERICAN: >60
GLUCOSE BLD-MCNC: 144 MG/DL (ref 70–99)
GLUCOSE BLD-MCNC: 190 MG/DL (ref 70–99)
GLUCOSE BLD-MCNC: 98 MG/DL (ref 70–99)
HCT VFR BLD CALC: 36 % (ref 36–48)
HEMOGLOBIN: 12.6 G/DL (ref 12–16)
LYMPHOCYTES ABSOLUTE: 1.7 K/UL (ref 1–5.1)
LYMPHOCYTES RELATIVE PERCENT: 14.3 %
MCH RBC QN AUTO: 28.7 PG (ref 26–34)
MCHC RBC AUTO-ENTMCNC: 34.9 G/DL (ref 31–36)
MCV RBC AUTO: 82.1 FL (ref 80–100)
MONOCYTES ABSOLUTE: 1.2 K/UL (ref 0–1.3)
MONOCYTES RELATIVE PERCENT: 10 %
NEUTROPHILS ABSOLUTE: 8.9 K/UL (ref 1.7–7.7)
NEUTROPHILS RELATIVE PERCENT: 75.3 %
PDW BLD-RTO: 13.3 % (ref 12.4–15.4)
PERFORMED ON: ABNORMAL
PERFORMED ON: NORMAL
PHOSPHORUS: 2.9 MG/DL (ref 2.5–4.9)
PLATELET # BLD: 331 K/UL (ref 135–450)
PMV BLD AUTO: 6.3 FL (ref 5–10.5)
POTASSIUM SERPL-SCNC: 4 MMOL/L (ref 3.5–5.1)
RBC # BLD: 4.39 M/UL (ref 4–5.2)
SODIUM BLD-SCNC: 130 MMOL/L (ref 136–145)
TOTAL PROTEIN: 5.7 G/DL (ref 6.4–8.2)
WBC # BLD: 11.8 K/UL (ref 4–11)

## 2021-03-28 PROCEDURE — 6370000000 HC RX 637 (ALT 250 FOR IP): Performed by: INTERNAL MEDICINE

## 2021-03-28 PROCEDURE — 85025 COMPLETE CBC W/AUTO DIFF WBC: CPT

## 2021-03-28 PROCEDURE — 80069 RENAL FUNCTION PANEL: CPT

## 2021-03-28 PROCEDURE — 6370000000 HC RX 637 (ALT 250 FOR IP): Performed by: NURSE PRACTITIONER

## 2021-03-28 PROCEDURE — 6360000002 HC RX W HCPCS: Performed by: INTERNAL MEDICINE

## 2021-03-28 PROCEDURE — 6360000002 HC RX W HCPCS: Performed by: NURSE PRACTITIONER

## 2021-03-28 PROCEDURE — 94760 N-INVAS EAR/PLS OXIMETRY 1: CPT

## 2021-03-28 PROCEDURE — 80076 HEPATIC FUNCTION PANEL: CPT

## 2021-03-28 PROCEDURE — 36415 COLL VENOUS BLD VENIPUNCTURE: CPT

## 2021-03-28 RX ORDER — AMLODIPINE BESYLATE 10 MG/1
10 TABLET ORAL DAILY
Qty: 30 TABLET | Refills: 3 | Status: SHIPPED | OUTPATIENT
Start: 2021-03-29

## 2021-03-28 RX ORDER — DEXAMETHASONE 2 MG/1
TABLET ORAL
Qty: 40 TABLET | Refills: 0 | Status: SHIPPED | OUTPATIENT
Start: 2021-03-28

## 2021-03-28 RX ORDER — LOSARTAN POTASSIUM 50 MG/1
50 TABLET ORAL DAILY
Qty: 30 TABLET | Refills: 3 | Status: SHIPPED | OUTPATIENT
Start: 2021-03-29

## 2021-03-28 RX ADMIN — LOSARTAN POTASSIUM 50 MG: 50 TABLET, FILM COATED ORAL at 08:35

## 2021-03-28 RX ADMIN — DEXAMETHASONE 10 MG: 4 TABLET ORAL at 08:35

## 2021-03-28 RX ADMIN — AMLODIPINE BESYLATE 10 MG: 10 TABLET ORAL at 08:35

## 2021-03-28 RX ADMIN — METOPROLOL TARTRATE 100 MG: 50 TABLET, FILM COATED ORAL at 08:35

## 2021-03-28 RX ADMIN — Medication 15 G: at 08:34

## 2021-03-28 RX ADMIN — ROSUVASTATIN CALCIUM 5 MG: 10 TABLET, FILM COATED ORAL at 08:35

## 2021-03-28 RX ADMIN — HYDROCODONE BITARTRATE AND HOMATROPINE METHYLBROMIDE 5 ML: 5; 1.5 SOLUTION ORAL at 05:02

## 2021-03-28 RX ADMIN — BENZOCAINE AND MENTHOL 1 LOZENGE: 15; 3.6 LOZENGE ORAL at 05:02

## 2021-03-28 RX ADMIN — LEVOTHYROXINE SODIUM 50 MCG: 0.05 TABLET ORAL at 05:02

## 2021-03-28 RX ADMIN — ENOXAPARIN SODIUM 40 MG: 40 INJECTION SUBCUTANEOUS at 08:35

## 2021-03-28 RX ADMIN — INSULIN LISPRO 3 UNITS: 100 INJECTION, SOLUTION INTRAVENOUS; SUBCUTANEOUS at 12:13

## 2021-03-28 RX ADMIN — BENZOCAINE AND MENTHOL 1 LOZENGE: 15; 3.6 LOZENGE ORAL at 02:00

## 2021-03-28 RX ADMIN — VENLAFAXINE HYDROCHLORIDE 37.5 MG: 37.5 CAPSULE, EXTENDED RELEASE ORAL at 08:35

## 2021-03-28 NOTE — CARE COORDINATION
greggw REVIEWED CHART.  LSW rec'd dc order. GREGGW spoke with bedside RN who reports she is no longer on IV s and no longer on oxygen. No needs.   [place], [place]     Case Management   [phone]    3/28/2021  1:09 PM

## 2021-03-28 NOTE — PROGRESS NOTES
NEPHROLOGY PROGRESS NOTE    Patient: Jim Pack MRN: [id number]     YOB: 1960  Age: 64 y.o. Sex: female    Unit: [address] Room/Bed: P8J-0985/5255-01 Location: [address] [place] [address]     Admitting Physician: Jonny SANCHEZ    Primary Care Physician: Alyssa Valentin MD          LOS: 6 days       Reason for evaluation:   Hyponatremia      SUBJECTIVE:      The patient was seen and examined. Notes and labs reviewed. There were no complications over night. Patient's review of systems: comfortable still with + cough      OBJECTIVE:     Vitals:    03/27/21 2300 03/28/21 0428 03/28/21 0829 03/28/21 0830   BP: 133/77 (!) 147/84 139/77    Pulse: 58 53 60 68   Resp: 16 18 16    Temp: 98.4 °F (36.9 °C) 98.2 °F (36.8 °C) 98.2 °F (36.8 °C)    TempSrc: Oral Oral Oral    SpO2: 93% 92% 93%    Weight:  200 lb 6.4 oz (90.9 kg)     Height:           Intake and Output:      Intake/Output Summary (Last 24 hours) at 3/28/2021 1604  Last data filed at 3/28/2021 1244  Gross per 24 hour   Intake 900 ml   Output 2000 ml   Net -1100 ml       Continuous Infusions:   dextrose         Exam:   CONSTITUTIONAL/PSYCHIATRY: awake, alert and oriented x3. Not in acute distress   EYES: Conjunctivae: normal.   RESPIRATORY: Respiratory effort: normal. Auscultation: CTA  CARDIOVASCULAR: Auscultation: RRR. Edema: none  GASTROINTESTINAL: Soft, nontender, nondistended. Obese abdomen  EXTREMITIES:  No cyanosis or clubbing. SKIN: Warm and dry.  No significant rashes      LABS:   RFP:   Recent Labs     03/26/21  0538 03/27/21  0543 03/28/21  0537   * 128* 130*   K 5.0 4.5 4.0   CL 95* 93* 97*   CO2 27 26 26   BUN 12 15 22*   CREATININE <0.5* <0.5* 0.6   CALCIUM 9.2 8.8 8.8   PHOS 3.2 3.3 2.9   GFRAA >60 >60 >60       Liver panel:  Recent Labs     03/26/21  0538 03/27/21  0543 03/28/21  0537   AST 28 19 14*   ALT 36 34 28       Endocrine:   @PGFRGKQP15(VitD,PTH,TSH,aldosterone,renin activity,cortisol,metanephrine)@    CBC:   Recent Labs     03/26/21  0538 03/27/21  0543 03/28/21  0537   WBC 8.0 7.3 11.8*   HGB 13.0 13.1 12.6   HCT 37.0 37.3 36.0   MCV 81.7 82.4 82.1    358 331       Iron Panel:   @[id number](FERA,FE)@    Serology: @[id number](ANAS,ANCA,C3,C4,RNP,AGBM,DNA,SSA,SSB,SPEP,UPEP,ESR,HEPT)@    Urine studies: @[id number](UAPR,UCOL,UNAR,UUNR,UCRR,UCLR,UKR,UUAR,UOSM,EOS)@        ASSESSMENT and PLAN:     1. Hyponatremia (baseline Na 137): asymptomatic, low TSH noted, normal serum cortisol, likely component of hypovolemic hyponatremia +/- HCTZ. SIADH was raised as a concern, however, John is <20, hence SIADH is unlikely. SNa is still drifting down despite despite NaCl tabs. NaCl d/david on 3/27 and started on Ure-Na at 15g/d.   - D/C Na tabs, encourage no Na restriction with diet    - Continue FR and decrease to 1200mL/d   - Continue Ure-Na at 15g/d   - Will need f/u with Nephrology upon discharge in 2-3weeks (Dr Stephani Burkitt)   - Avoid any further use of HCTZ    2. COVID Pneumonia: on less O2, pulmonary following, on remdesivir and dexamethasone     3. Hypokalemia: resolved with repletion     4. HTN: BP adequate with amlodipine. 5. Dispo:  Ok to d/c today        Signed By: Te Morel MD

## 2021-03-28 NOTE — DISCHARGE INSTR - COC
Continuity of Care Form    Patient Name: Merlin Marlin   :  1960  MRN:  [id number]    Admit date:  3/21/2021  Discharge date:  ***    Code Status Order: Full Code   Advance Directives:   Advance Care Flowsheet Documentation     Date/Time Healthcare Directive Type of Healthcare Directive Copy in [address] Agent's Name Healthcare Agent's Phone Number    21 0113  Yes, patient has an advance directive for healthcare treatment  Living will  No, copy requested from family  --  --  --          Admitting Physician:  Sidra Manriquez MD  PCP: Juan Garcia MD    Discharging Nurse: [facility] Unit/Room#: [address] Unit Phone Number: ***    Emergency Contact:   Extended Emergency Contact Information  Primary Emergency Contact: Edmond Stuart  Address: [address] [place] [address],[address], [address] [facility]  Home Phone: [phone]  Work Phone: [phone]  Relation: Spouse  Secondary Emergency Contact: Jamaaltatiana Phone: [phone]  Work Phone: [phone]  Relation: Other    Past Surgical History:  Past Surgical History:   Procedure Laterality Date    COLONOSCOPY      COLONOSCOPY  2018    HYSTERECTOMY         Immunization History:   Immunization History   Administered Date(s) Administered    Influenza Virus Vaccine 10/28/2014, 2015    Influenza, Quadv, IM, PF (6 mo and older Fluzone, Flulaval, Fluarix, and 3 yrs and older Afluria) 2016, 2019    Tdap (Boostrix, Adacel) 03/15/2014       Active Problems:  Patient Active Problem List   Diagnosis Code    BMI 35.0-35.9,adult Z68.35    Hypertriglyceridemia E78.1    Lipoma of torso D17.1    Hot flashes R23.2    Hyperglycemia R73.9    Acquired hypothyroidism E03.9    Essential hypertension I10    Allergic rhinitis J30.9    Status post hysterectomy Z90.710    headache R51.9    Lipoma of right lower extremity D17.23    Gastroesophageal reflux Other Feedings:478447909}  Liquids: {Slp liquid thickness:41493}  Daily Fluid Restriction: {CHP DME Yes amt example:406589834}  Last Modified Barium Swallow with Video (Video Swallowing Test): {Done Not Done XEWK:363159038}    Treatments at the Time of Hospital Discharge:   Respiratory Treatments: ***  Oxygen Therapy:  {Therapy; copd oxygen:05320}  Ventilator:    { CC Vent AQFJ:041590614}    Rehab Therapies: {THERAPEUTIC INTERVENTION:3847319446}  Weight Bearing Status/Restrictions: { CC Weight Bearin}  Other Medical Equipment (for information only, NOT a DME order):  {EQUIPMENT:606807002}  Other Treatments: ***    Patient's personal belongings (please select all that are sent with patient):  {CHP DME Belongings:883453215}    RN SIGNATURE:  {Esignature:592351835}    CASE MANAGEMENT/SOCIAL WORK SECTION    Inpatient Status Date: ***    Readmission Risk Assessment Score:  Readmission Risk              Risk of Unplanned Readmission:        18           Discharging to Facility/ Agency   · Name:   · Address:  · Phone:  · Fax:    Dialysis Facility (if applicable)   · Name:  · Address:  · Dialysis Schedule:  · Phone:  · Fax:    / signature: {Esignature:423362674}    PHYSICIAN SECTION    Prognosis: {Prognosis:1376784892}    Condition at Discharge: [facility] Patient Condition:909474713}    Rehab Potential (if transferring to Rehab): {Prognosis:4021180643}    Recommended Labs or Other Treatments After Discharge: ***    Physician Certification: I certify the above information and transfer of Rosanne Grammes  is necessary for the continuing treatment of the diagnosis listed and that she requires {Admit to Appropriate Level of Care:79085} for {GREATER/LESS:458032535} 30 days.      Update Admission H&P: {CHP DME Changes in NLYEO:141741935}    PHYSICIAN SIGNATURE:  {Esignature:501413357}

## 2021-03-28 NOTE — PROGRESS NOTES
Hospitalist Progress Note      PCP: Caroline Melendez MD    Date of Admission: 3/21/2021    Subjective: Na level dropped today, still off O2    Medications:  Reviewed    Infusion Medications    dextrose       Scheduled Medications    urea  15 g Oral Daily    [START ON 3/28/2021] dexamethasone  10 mg Oral Daily    insulin lispro  0-18 Units Subcutaneous TID     insulin lispro  0-9 Units Subcutaneous Nightly    insulin glargine  15 Units Subcutaneous Nightly    amitriptyline  25 mg Oral Nightly    [Held by provider] potassium chloride  20 mEq Oral BID     losartan  50 mg Oral Daily    amLODIPine  10 mg Oral Daily    levothyroxine  50 mcg Oral Daily    metoprolol  100 mg Oral BID    rosuvastatin  5 mg Oral Daily    venlafaxine  37.5 mg Oral Daily    enoxaparin  40 mg Subcutaneous BID     PRN Meds: sodium chloride, HYDROcodone-homatropine, glucose, dextrose, glucagon (rDNA), dextrose, azelastine, benzonatate, sodium chloride flush, promethazine **OR** ondansetron, polyethylene glycol, acetaminophen **OR** acetaminophen, guaiFENesin-dextromethorphan, benzocaine-menthol, sodium chloride, albuterol sulfate HFA      Intake/Output Summary (Last 24 hours) at 3/27/2021 2113  Last data filed at 3/27/2021 1756  Gross per 24 hour   Intake 960 ml   Output 2150 ml   Net -1190 ml       Physical Exam Performed:    BP (!) 143/78   Pulse 63   Temp 98.5 °F (36.9 °C) (Oral)   Resp 18   Ht 5' 4\" (1.626 m)   Wt 201 lb 8 oz (91.4 kg)   SpO2 94%   BMI 34.59 kg/m²        General appearance: Ill-appearing   HEENT:  Normal cephalic, atraumatic without obvious deformity. Pupils equal, round, and reactive to light.  Extra ocular muscles intact. Conjunctivae/corneas clear. Neck: Supple, with full range of motion. No jugular venous distention. Trachea midline.   Respiratory: Increased respiratory effort.  Bibasilar Rales, no accessory muscle use   Cardiovascular:  Regular rate and rhythm without murmurs, rubs or gallops. Abdomen: Soft, non-tender, non-distended, without rebound or guarding. Normal bowel sounds. Musculoskeletal:  No clubbing, cyanosis or edema bilaterally.  Full range of motion without deformity. Skin: Skin color, texture, turgor normal.  No rashes or lesions. Neurologic:  Neurovascularly intact without any focal sensory/motor deficits. Cranial nerves: II-XII intact, grossly non-focal.  Psychiatric:  Alert and oriented, thought content appropriate, normal insight  Capillary Refill: Brisk,< 3 seconds   Peripheral Pulses: +2 palpable, equal bilaterally        Labs:   Recent Labs     03/25/21  0545 03/26/21  0538 03/27/21  0543   WBC 7.8 8.0 7.3   HGB 11.3* 13.0 13.1   HCT 32.4* 37.0 37.3    349 358     Recent Labs     03/25/21  0545 03/26/21  0538 03/27/21  0543   * 130* 128*   K 4.8 5.0 4.5   CL 94* 95* 93*   CO2 25 27 26   BUN 12 12 15   CREATININE <0.5* <0.5* <0.5*   CALCIUM 8.7 9.2 8.8   PHOS 3.0 3.2 3.3     Recent Labs     03/25/21  0545 03/26/21  0538 03/27/21  0543   AST 20 28 19   ALT 23 36 34   BILIDIR <0.2 <0.2 <0.2   BILITOT 0.4 0.4 0.5   ALKPHOS 62 65 64     No results for input(s): INR in the last 72 hours. No results for input(s): Darline Lacks in the last 72 hours. Urinalysis:      Lab Results   Component Value Date    NITRU Negative 03/22/2021    WBCUA 8 03/22/2021    BACTERIA 1+ 03/22/2021    RBCUA 2 03/22/2021    BLOODU Negative 03/22/2021    SPECGRAV 1.008 03/22/2021    GLUCOSEU Negative 03/22/2021       Radiology:  XR CHEST PORTABLE   Final Result   Multifocal bibasilar predominant airspace disease compatible with the   provided history of COVID-19 pneumonia.                  Assessment/Plan:    Active Hospital Problems    Diagnosis    Acute respiratory failure due to COVID-19 ([facility] [U07.1, J96.00]    Acute respiratory failure with hypoxia ([facility] [J96.01]    BMI 35.0-35.9,adult [Z68.35]    Acquired hypothyroidism [E03.9]    Essential hypertension [I10] Covid 19 Pneumonia  - droplet plus isolation precautions  - Covid 19 swab + 3/12/2021 (Symptom onset 3/9/2021)  - Oxygen titrate to keep saturation > 90%  - Chest xray: Multifocal bibasilar predominant airspace disease compatible with COVID-19 pneumonia  - on Decadron/remdesivir  - consulted Pulmonology     Acute hypoxic respiratory failure - resolved  - due to Covid Pnuemonia  - with increased work of breath, tachypnea and hypoxia  - room air saturation 81%  - provide supplemental oxygen as necessary to keep SaO2 92% or greater.  - O2 requirement improved from 7L-->6-->5-->1L-->resolved  - added IS     Hyponatremia - improved  - na 118-->122-->125-->130, dropped again to 126-->130-->128  - nephrology consulted in ED     Hyperglycemia/PreDM II - check A1c 6.4,  ?steroid induced, on SSI, add lantus/SSI.  Switched diet to carb diet     Essential (primary) hypertension   - BP on the lower end  - continue home meds - decreased losartan     Hyperlipidemia   - continue statin     Hypothyroidism  - check tsh  - continue synthroid           DVT Prophylaxis: Lovenox  Diet: DIET CARB CONTROL; Daily Fluid Restriction: 1000 ml  Code Status: Full Code    PT/OT Eval Status: ordered    Dispo - will monitor another night, dc in am if Na level stable    Jean Wheeler MD

## 2021-03-29 ENCOUNTER — CARE COORDINATION (OUTPATIENT)
Dept: CASE MANAGEMENT | Age: 61
End: 2021-03-29

## 2021-03-29 NOTE — DISCHARGE SUMMARY
Hospital Medicine Discharge Summary    Patient ID: Oriana Croft      Patient's PCP: Dallin Soto MD    Admit Date: 3/21/2021     Discharge Date: 3/28/2021     Admitting Physician: Saintclair Penman, MD     Discharge Physician: Kp Armenta MD     Discharge Diagnoses: Active Hospital Problems    Diagnosis    Acute respiratory failure due to COVID-19 ([facility] [U07.1, J96.00]    Acute respiratory failure with hypoxia ([facility] [J96.01]    BMI 35.0-35.9,adult [Z68.35]    Acquired hypothyroidism [E03.9]    Essential hypertension [I10]       The patient was seen and examined on day of discharge and this discharge summary is in conjunction with any daily progress note from day of discharge. Hospital Course:   64 y.o. female with PMHx of HTN, HLD and hypothyroidism presented to [facility] with cough and shortness of breath. Patient was diagnosed with Covid on 3/12/2021. She states her symptoms began on 3/9 initially with loss of taste and decreased appetite. She then developed cough with shortness of breath. Shortness of breath is worsened with any exertion. She complains of fatigue and myalgias. On arrival to the emergency department she was hypoxic with room air saturation of 81%. She has had no treatment other than cough medicine as needed.       Covid 19 Pneumonia  - droplet plus isolation precautions  - Covid 19 swab + 3/12/2021 (Symptom onset 3/9/2021)  - Oxygen titrate to keep saturation > 90%  - Chest xray: Multifocal bibasilar predominant airspace disease compatible with COVID-19 pneumonia  - on Decadron/remdesivir  - consulted Pulmonology  - dc on PO decadron taper     Acute hypoxic respiratory failure - resolved  - due to Covid Pnuemonia  - with increased work of breath, tachypnea and hypoxia  - room air saturation 81%  - provide supplemental oxygen as necessary to keep SaO2 92% or greater.  - O2 requirement improved from 7L-->resolved  - added IS     Hyponatremia - improved  - na 118-->122-->125-->130, dropped again to 126-->130-->128  - nephrology consulted  - ok to dc per nephrology on PO ure-na packet daily for 3 weeks and outpt f/u with repeat BMP     Hyperglycemia/PreDM II - check A1c 6.4,  ?steroid induced, on SSI, add lantus/SSI. Switched diet to carb diet     Essential (primary) hypertension   - controlled     Hyperlipidemia   - continue statin     Hypothyroidism  - check tsh  - continue synthroid      Physical Exam Performed:     /77   Pulse 68   Temp 98.2 °F (36.8 °C) (Oral)   Resp 16   Ht 5' 4\" (1.626 m)   Wt 200 lb 6.4 oz (90.9 kg)   SpO2 93%   BMI 34.40 kg/m²     General appearance: Ill-appearing   HEENT:  Normal cephalic, atraumatic without obvious deformity. Pupils equal, round, and reactive to light.  Extra ocular muscles intact. Conjunctivae/corneas clear. Neck: Supple, with full range of motion. No jugular venous distention. Trachea midline. Respiratory: Increased respiratory effort.  Bibasilar Rales, no accessory muscle use   Cardiovascular:  Regular rate and rhythm without murmurs, rubs or gallops. Abdomen: Soft, non-tender, non-distended, without rebound or guarding. Normal bowel sounds. Musculoskeletal:  No clubbing, cyanosis or edema bilaterally.  Full range of motion without deformity. Skin: Skin color, texture, turgor normal.  No rashes or lesions. Neurologic:  Neurovascularly intact without any focal sensory/motor deficits. Cranial nerves: II-XII intact, grossly non-focal.  Psychiatric:  Alert and oriented, thought content appropriate, normal insight  Capillary Refill: Brisk,< 3 seconds   Peripheral Pulses: +2 palpable, equal bilaterally        Labs:  For convenience and continuity at follow-up the following most recent labs are provided:      CBC:    Lab Results   Component Value Date    WBC 11.8 03/28/2021    HGB 12.6 03/28/2021    HCT 36.0 03/28/2021     03/28/2021       Renal:    Lab Results   Component Value Date    NA 130 03/28/2021    K 4.0 03/28/2021    K 3.8 03/22/2021    CL 97 03/28/2021    CO2 26 03/28/2021    BUN 22 03/28/2021    CREATININE 0.6 03/28/2021    CALCIUM 8.8 03/28/2021    PHOS 2.9 03/28/2021         Significant Diagnostic Studies    Radiology:   XR CHEST PORTABLE   Final Result   Multifocal bibasilar predominant airspace disease compatible with the   provided history of COVID-19 pneumonia.                 Consults:     IP CONSULT TO HOSPITALIST  IP CONSULT TO NEPHROLOGY  IP CONSULT TO PULMONOLOGY    Disposition:  home     Condition at Discharge: Stable    Discharge Instructions/Follow-up:  PCP in 1 week    Code Status:  Prior     Activity: activity as tolerated    Diet: regular diet      Discharge Medications:     Discharge Medication List as of 3/28/2021  4:49 PM           Details   dexamethasone (DECADRON) 2 MG tablet Take 5 tablets by mouth for 5 days, then take 3 tablets by mouth for 3 days, then take 1 tablet by mouth for 3 days, then stop, Disp-40 tablet, R-0Normal      urea (URE-NA) 15 g PACK packet Take 15 g by mouth daily for 21 days, Disp-21 Package, R-0Normal              Details   losartan (COZAAR) 50 MG tablet Take 1 tablet by mouth daily, Disp-30 tablet, R-3Normal      amLODIPine (NORVASC) 10 MG tablet Take 1 tablet by mouth daily, Disp-30 tablet, R-3Normal              Details   potassium chloride (KLOR-CON) 10 MEQ extended release tablet Take 10 mEq by mouth dailyHistorical Med      rosuvastatin (CRESTOR) 5 MG tablet Take 5 mg by mouth dailyHistorical Med      benzonatate (TESSALON) 200 MG capsule Take 200 mg by mouth 3 times daily as needed for CoughHistorical Med      amitriptyline (ELAVIL) 25 MG tablet TAKE 1 TABLET BY MOUTH TWICE DAILY AS NEEDED FOR SLEEP OR PAIN, Disp-60 tablet,R-11Normal      venlafaxine (EFFEXOR XR) 75 MG extended release capsule TAKE 1 CAPSULE BY MOUTH DAILY, Disp-90 capsule, R-2Normal      levothyroxine (SYNTHROID) 50 MCG tablet TAKE 1 TABLET BY MOUTH DAILY, Disp-30 tablet, R-11Normal      metoprolol (LOPRESSOR) 100 MG tablet Take 1 tablet by mouth 2 times daily, Disp-60 tablet, R-2Normal      acetaminophen (TYLENOL) 500 MG tablet Take 1 tablet by mouth 4 times daily as needed for Pain, Disp-50 tablet, R-0Print      azelastine HCl 0.15 % SOLN 2 sprays by Each Nare route              Time Spent on discharge is more than 30 minutes in the examination, evaluation, counseling and review of medications and discharge plan. Signed:    Jean Wheeler MD   3/29/2021      Thank you Juan Garcia MD for the opportunity to be involved in this patient's care. If you have any questions or concerns please feel free to contact me at [phone].

## 2021-03-29 NOTE — CARE COORDINATION
Initial attempt at [facility] monitoring discharge phone call. Unable to reach patient. Left message. Contact info provided. Requested return call to [facility].

## 2021-03-30 ENCOUNTER — CARE COORDINATION (OUTPATIENT)
Dept: CASE MANAGEMENT | Age: 61
End: 2021-03-30

## 2021-03-30 NOTE — CARE COORDINATION
2nd and final attempt at [place] [address] monitoring discharge phone call. Unable to reach patient. Left message. Informed Dipika Cuevas this would be the final [facility] outreach.

## 2021-04-01 ENCOUNTER — TELEPHONE (OUTPATIENT)
Dept: INTERNAL MEDICINE CLINIC | Age: 61
End: 2021-04-01

## 2021-04-01 NOTE — TELEPHONE ENCOUNTER
[facility] Initial Follow Up Call    Outreach made within 2 business days of discharge: Yes    Patient: Luis Melo Patient : 1960   MRN: <[id number]>  Reason for Admission: There are no discharge diagnoses documented for the most recent discharge. Discharge Date: 3/28/21      I left a message on voice mail to please call back. Discharge department/facility: [facility]          Follow Up  No future appointments.     Valentino Gilmore LPN

## 2021-04-05 ENCOUNTER — TELEPHONE (OUTPATIENT)
Dept: INTERNAL MEDICINE CLINIC | Age: 61
End: 2021-04-05

## 2021-04-05 NOTE — TELEPHONE ENCOUNTER
[facility] Initial Follow Up Call    Outreach made within 2 business days of discharge: Yes    Patient: Yaima Montoya Patient : 1960   MRN: <[id number]>  Reason for Admission: There are no discharge diagnoses documented for the most recent discharge. Discharge Date: 3/28/21         I called and left another message for patient to call back to schedule follow up after recent hospitalization. Follow Up  No future appointments.     Juanita Toussaint LPN

## 2021-09-02 LAB
AVERAGE GLUCOSE: NORMAL
HBA1C MFR BLD: 5.6 %

## 2021-12-09 ENCOUNTER — HOSPITAL ENCOUNTER (OUTPATIENT)
Dept: WOMENS IMAGING | Age: 61
Discharge: HOME | End: 2021-12-09
Payer: COMMERCIAL

## 2021-12-09 DIAGNOSIS — Z12.31 VISIT FOR SCREENING MAMMOGRAM: ICD-10-CM

## 2021-12-09 PROCEDURE — 77063 BREAST TOMOSYNTHESIS BI: CPT

## 2023-03-16 ENCOUNTER — HOSPITAL ENCOUNTER (OUTPATIENT)
Dept: WOMENS IMAGING | Age: 63
Discharge: HOME | End: 2023-03-16
Payer: COMMERCIAL

## 2023-03-16 DIAGNOSIS — Z12.31 BREAST CANCER SCREENING BY MAMMOGRAM: ICD-10-CM

## 2023-03-16 PROCEDURE — 77067 SCR MAMMO BI INCL CAD: CPT

## 2024-03-21 ENCOUNTER — HOSPITAL ENCOUNTER (OUTPATIENT)
Dept: WOMENS IMAGING | Age: 64
Discharge: HOME | End: 2024-03-21
Payer: COMMERCIAL

## 2024-03-21 DIAGNOSIS — Z12.31 ENCOUNTER FOR SCREENING MAMMOGRAM FOR BREAST CANCER: ICD-10-CM

## 2024-03-21 PROCEDURE — 77063 BREAST TOMOSYNTHESIS BI: CPT
